# Patient Record
Sex: MALE | Race: ASIAN | NOT HISPANIC OR LATINO | URBAN - METROPOLITAN AREA
[De-identification: names, ages, dates, MRNs, and addresses within clinical notes are randomized per-mention and may not be internally consistent; named-entity substitution may affect disease eponyms.]

---

## 2021-01-06 ENCOUNTER — OUTPATIENT (OUTPATIENT)
Dept: OUTPATIENT SERVICES | Facility: HOSPITAL | Age: 46
LOS: 1 days | End: 2021-01-06
Payer: MEDICAID

## 2021-01-06 VITALS
HEART RATE: 69 BPM | SYSTOLIC BLOOD PRESSURE: 138 MMHG | TEMPERATURE: 98 F | DIASTOLIC BLOOD PRESSURE: 88 MMHG | HEIGHT: 66 IN | RESPIRATION RATE: 15 BRPM | WEIGHT: 186.95 LBS | OXYGEN SATURATION: 99 %

## 2021-01-06 DIAGNOSIS — Z98.890 OTHER SPECIFIED POSTPROCEDURAL STATES: Chronic | ICD-10-CM

## 2021-01-06 DIAGNOSIS — Z98.49 CATARACT EXTRACTION STATUS, UNSPECIFIED EYE: Chronic | ICD-10-CM

## 2021-01-06 DIAGNOSIS — M51.9 UNSPECIFIED THORACIC, THORACOLUMBAR AND LUMBOSACRAL INTERVERTEBRAL DISC DISORDER: ICD-10-CM

## 2021-01-06 DIAGNOSIS — M47.816 SPONDYLOSIS WITHOUT MYELOPATHY OR RADICULOPATHY, LUMBAR REGION: ICD-10-CM

## 2021-01-06 DIAGNOSIS — Z29.9 ENCOUNTER FOR PROPHYLACTIC MEASURES, UNSPECIFIED: ICD-10-CM

## 2021-01-06 DIAGNOSIS — Z01.818 ENCOUNTER FOR OTHER PREPROCEDURAL EXAMINATION: ICD-10-CM

## 2021-01-06 LAB
ANION GAP SERPL CALC-SCNC: 12 MMOL/L — SIGNIFICANT CHANGE UP (ref 5–17)
BLD GP AB SCN SERPL QL: NEGATIVE — SIGNIFICANT CHANGE UP
BUN SERPL-MCNC: 8 MG/DL — SIGNIFICANT CHANGE UP (ref 7–23)
CALCIUM SERPL-MCNC: 9.6 MG/DL — SIGNIFICANT CHANGE UP (ref 8.4–10.5)
CHLORIDE SERPL-SCNC: 104 MMOL/L — SIGNIFICANT CHANGE UP (ref 96–108)
CO2 SERPL-SCNC: 24 MMOL/L — SIGNIFICANT CHANGE UP (ref 22–31)
CREAT SERPL-MCNC: 0.88 MG/DL — SIGNIFICANT CHANGE UP (ref 0.5–1.3)
GLUCOSE SERPL-MCNC: 98 MG/DL — SIGNIFICANT CHANGE UP (ref 70–99)
HCT VFR BLD CALC: 45.7 % — SIGNIFICANT CHANGE UP (ref 39–50)
HGB BLD-MCNC: 14.7 G/DL — SIGNIFICANT CHANGE UP (ref 13–17)
MCHC RBC-ENTMCNC: 28.8 PG — SIGNIFICANT CHANGE UP (ref 27–34)
MCHC RBC-ENTMCNC: 32.2 GM/DL — SIGNIFICANT CHANGE UP (ref 32–36)
MCV RBC AUTO: 89.4 FL — SIGNIFICANT CHANGE UP (ref 80–100)
NRBC # BLD: 0 /100 WBCS — SIGNIFICANT CHANGE UP (ref 0–0)
PLATELET # BLD AUTO: 283 K/UL — SIGNIFICANT CHANGE UP (ref 150–400)
POTASSIUM SERPL-MCNC: 4 MMOL/L — SIGNIFICANT CHANGE UP (ref 3.5–5.3)
POTASSIUM SERPL-SCNC: 4 MMOL/L — SIGNIFICANT CHANGE UP (ref 3.5–5.3)
RBC # BLD: 5.11 M/UL — SIGNIFICANT CHANGE UP (ref 4.2–5.8)
RBC # FLD: 12.7 % — SIGNIFICANT CHANGE UP (ref 10.3–14.5)
RH IG SCN BLD-IMP: POSITIVE — SIGNIFICANT CHANGE UP
SODIUM SERPL-SCNC: 140 MMOL/L — SIGNIFICANT CHANGE UP (ref 135–145)
WBC # BLD: 9.1 K/UL — SIGNIFICANT CHANGE UP (ref 3.8–10.5)
WBC # FLD AUTO: 9.1 K/UL — SIGNIFICANT CHANGE UP (ref 3.8–10.5)

## 2021-01-06 PROCEDURE — 87640 STAPH A DNA AMP PROBE: CPT

## 2021-01-06 PROCEDURE — 86900 BLOOD TYPING SEROLOGIC ABO: CPT

## 2021-01-06 PROCEDURE — 86850 RBC ANTIBODY SCREEN: CPT

## 2021-01-06 PROCEDURE — 87641 MR-STAPH DNA AMP PROBE: CPT

## 2021-01-06 PROCEDURE — 80048 BASIC METABOLIC PNL TOTAL CA: CPT

## 2021-01-06 PROCEDURE — 85027 COMPLETE CBC AUTOMATED: CPT

## 2021-01-06 PROCEDURE — G0463: CPT

## 2021-01-06 PROCEDURE — 86901 BLOOD TYPING SEROLOGIC RH(D): CPT

## 2021-01-06 RX ORDER — CEFAZOLIN SODIUM 1 G
2000 VIAL (EA) INJECTION ONCE
Refills: 0 | Status: DISCONTINUED | OUTPATIENT
Start: 2021-01-22 | End: 2021-01-23

## 2021-01-06 NOTE — H&P PST ADULT - NSICDXPASTMEDICALHX_GEN_ALL_CORE_FT
PAST MEDICAL HISTORY:  Lumbar herniated disc     DESIREE (obstructive sleep apnea) noncompliant with CPAP

## 2021-01-06 NOTE — H&P PST ADULT - ACTIVITY
able to walk briskly for about 5 mins and then will begin to experience back pain which limits his activity

## 2021-01-06 NOTE — H&P PST ADULT - NSICDXPROBLEM_GEN_ALL_CORE_FT
PROBLEM DIAGNOSES  Problem: Lumbar disc disease  Assessment and Plan: Scheduled lami and fusion  nasal swab collected  will c/w meds as prescribed      Problem: Need for prophylactic measure  Assessment and Plan: The Caprini score indicates this patient is at risk for a VTE event (score 3-5).  Most surgical patients in this group would benefit from pharmacologic prophylaxis.  The surgical team will determine the balance between VTE risk and bleeding risk

## 2021-01-06 NOTE — H&P PST ADULT - HISTORY OF PRESENT ILLNESS
44 y/o male with  46 y/o male with h/o low back pain s/p lumbar discectomy in 2007. Over the past 3 years, pt states he has been experiencing worsening low back pain with pain radiating into both legs with an occasional "hot sensation" in his calves. He is currently taking meloxicam which offer little to no pain relief. Presents for L3-L4 laminectomy, posterior lumbar interbody fusion, pedicle screw fixation, iliac crest bone graft.       *covid scheduled for 1/19 at [facility].

## 2021-01-06 NOTE — H&P PST ADULT - ASSESSMENT
CAPRINI SCORE [CLOT updated 18]    AGE RELATED RISK FACTORS                                                       MOBILITY RELATED FACTORS  [ ] Age 41-60 years                                            (1 Point)                    [ ] Bed rest                                                        (1 Point)  [ ] Age: 61-74 years                                           (2 Points)                  [ ] Plaster cast                                                   (2 Points)  [ ] Age= 75 years                                              (3 Points)                    [ ] Bed bound for more than 72 hours                 (2 Points)    DISEASE RELATED RISK FACTORS                                               GENDER SPECIFIC FACTORS  [ ] Edema in the lower extremities                       (1 Point)              [ ] Pregnancy                                                     (1 Point)  [ ] Varicose veins                                               (1 Point)                     [ ] Post-partum < 6 weeks                                   (1 Point)             [ ] BMI > 25 Kg/m2                                            (1 Point)                     [ ] Hormonal therapy  or oral contraception          (1 Point)                 [ ] Sepsis (in the previous month)                        (1 Point)               [ ] History of pregnancy complications                 (1 point)  [ ] Pneumonia or serious lung disease                                               [ ] Unexplained or recurrent                     (1 Point)           (in the previous month)                               (1 Point)  [ ] Abnormal pulmonary function test                     (1 Point)                 SURGERY RELATED RISK FACTORS  [ ] Acute myocardial infarction                              (1 Point)               [ ]  Section                                             (1 Point)  [ ] Congestive heart failure (in the previous month)  (1 Point)      [ ] Minor surgery                                                  (1 Point)   [ ] Inflammatory bowel disease                             (1 Point)               [ ] Arthroscopic surgery                                        (2 Points)  [ ] Central venous access                                      (2 Points)                [ ] General surgery lasting more than 45 minutes (2 points)  [ ] Present or previous malignancy                     (2 Points)                [ ] Elective arthroplasty                                         (5 points)    [ ] Stroke (in the previous month)                          (5 Points)                                                                                                                                                           HEMATOLOGY RELATED FACTORS                                                 TRAUMA RELATED RISK FACTORS  [ ] Prior episodes of VTE                                     (3 Points)                [ ] Fracture of the hip, pelvis, or leg                       (5 Points)  [ ] Positive family history for VTE                         (3 Points)             [ ] Acute spinal cord injury (in the previous month)  (5 Points)  [ ] Prothrombin 41503 A                                     (3 Points)               [ ] Paralysis  (less than 1 month)                             (5 Points)  [ ] Factor V Leiden                                             (3 Points)                  [ ] Multiple Trauma within 1 month                        (5 Points)  [ ] Lupus anticoagulants                                     (3 Points)                                                           [ ] Anticardiolipin antibodies                               (3 Points)                                                       [ ] High homocysteine in the blood                      (3 Points)                                             [ ] Other congenital or acquired thrombophilia      (3 Points)                                                [ ] Heparin induced thrombocytopenia                  (3 Points)                                     Total Score [    4      ]

## 2021-01-07 LAB
MRSA PCR RESULT.: SIGNIFICANT CHANGE UP
S AUREUS DNA NOSE QL NAA+PROBE: SIGNIFICANT CHANGE UP

## 2021-01-16 PROBLEM — G47.33 OBSTRUCTIVE SLEEP APNEA (ADULT) (PEDIATRIC): Chronic | Status: ACTIVE | Noted: 2021-01-06

## 2021-01-16 PROBLEM — M51.26 OTHER INTERVERTEBRAL DISC DISPLACEMENT, LUMBAR REGION: Chronic | Status: ACTIVE | Noted: 2021-01-06

## 2021-01-19 ENCOUNTER — OUTPATIENT (OUTPATIENT)
Dept: OUTPATIENT SERVICES | Facility: HOSPITAL | Age: 46
LOS: 1 days | End: 2021-01-19
Payer: MEDICAID

## 2021-01-19 DIAGNOSIS — Z20.828 CONTACT WITH AND (SUSPECTED) EXPOSURE TO OTHER VIRAL COMMUNICABLE DISEASES: ICD-10-CM

## 2021-01-19 DIAGNOSIS — Z98.890 OTHER SPECIFIED POSTPROCEDURAL STATES: Chronic | ICD-10-CM

## 2021-01-19 DIAGNOSIS — Z98.49 CATARACT EXTRACTION STATUS, UNSPECIFIED EYE: Chronic | ICD-10-CM

## 2021-01-19 LAB — SARS-COV-2 RNA SPEC QL NAA+PROBE: SIGNIFICANT CHANGE UP

## 2021-01-19 PROCEDURE — U0003: CPT

## 2021-01-19 PROCEDURE — U0005: CPT

## 2021-01-19 PROCEDURE — C9803: CPT

## 2021-01-22 ENCOUNTER — INPATIENT (INPATIENT)
Facility: HOSPITAL | Age: 46
LOS: 4 days | Discharge: HOME HEALTH | DRG: 454 | End: 2021-01-27
Attending: NEUROLOGICAL SURGERY | Admitting: NEUROLOGICAL SURGERY
Payer: MEDICAID

## 2021-01-22 VITALS
HEIGHT: 66 IN | OXYGEN SATURATION: 98 % | HEART RATE: 83 BPM | SYSTOLIC BLOOD PRESSURE: 137 MMHG | TEMPERATURE: 98 F | RESPIRATION RATE: 18 BRPM | DIASTOLIC BLOOD PRESSURE: 91 MMHG | WEIGHT: 186.95 LBS

## 2021-01-22 DIAGNOSIS — Z98.890 OTHER SPECIFIED POSTPROCEDURAL STATES: Chronic | ICD-10-CM

## 2021-01-22 DIAGNOSIS — M47.816 SPONDYLOSIS WITHOUT MYELOPATHY OR RADICULOPATHY, LUMBAR REGION: ICD-10-CM

## 2021-01-22 DIAGNOSIS — Z98.49 CATARACT EXTRACTION STATUS, UNSPECIFIED EYE: Chronic | ICD-10-CM

## 2021-01-22 LAB
ANION GAP SERPL CALC-SCNC: 10 MMOL/L — SIGNIFICANT CHANGE UP (ref 5–17)
BASOPHILS # BLD AUTO: 0.01 K/UL — SIGNIFICANT CHANGE UP (ref 0–0.2)
BASOPHILS NFR BLD AUTO: 0.1 % — SIGNIFICANT CHANGE UP (ref 0–2)
BUN SERPL-MCNC: 8 MG/DL — SIGNIFICANT CHANGE UP (ref 7–23)
CALCIUM SERPL-MCNC: 7.4 MG/DL — LOW (ref 8.4–10.5)
CHLORIDE SERPL-SCNC: 109 MMOL/L — HIGH (ref 96–108)
CO2 SERPL-SCNC: 20 MMOL/L — LOW (ref 22–31)
CREAT SERPL-MCNC: 0.74 MG/DL — SIGNIFICANT CHANGE UP (ref 0.5–1.3)
EOSINOPHIL # BLD AUTO: 0 K/UL — SIGNIFICANT CHANGE UP (ref 0–0.5)
EOSINOPHIL NFR BLD AUTO: 0 % — SIGNIFICANT CHANGE UP (ref 0–6)
GAS PNL BLDA: SIGNIFICANT CHANGE UP
GLUCOSE SERPL-MCNC: 167 MG/DL — HIGH (ref 70–99)
HCT VFR BLD CALC: 31.9 % — LOW (ref 39–50)
HCT VFR BLD CALC: 32.6 % — LOW (ref 39–50)
HGB BLD-MCNC: 10.8 G/DL — LOW (ref 13–17)
HGB BLD-MCNC: 10.9 G/DL — LOW (ref 13–17)
IMM GRANULOCYTES NFR BLD AUTO: 1 % — SIGNIFICANT CHANGE UP (ref 0–1.5)
LYMPHOCYTES # BLD AUTO: 1.41 K/UL — SIGNIFICANT CHANGE UP (ref 1–3.3)
LYMPHOCYTES # BLD AUTO: 11.4 % — LOW (ref 13–44)
MCHC RBC-ENTMCNC: 29.6 PG — SIGNIFICANT CHANGE UP (ref 27–34)
MCHC RBC-ENTMCNC: 29.6 PG — SIGNIFICANT CHANGE UP (ref 27–34)
MCHC RBC-ENTMCNC: 33.4 GM/DL — SIGNIFICANT CHANGE UP (ref 32–36)
MCHC RBC-ENTMCNC: 33.9 GM/DL — SIGNIFICANT CHANGE UP (ref 32–36)
MCV RBC AUTO: 87.4 FL — SIGNIFICANT CHANGE UP (ref 80–100)
MCV RBC AUTO: 88.6 FL — SIGNIFICANT CHANGE UP (ref 80–100)
MONOCYTES # BLD AUTO: 0.31 K/UL — SIGNIFICANT CHANGE UP (ref 0–0.9)
MONOCYTES NFR BLD AUTO: 2.5 % — SIGNIFICANT CHANGE UP (ref 2–14)
NEUTROPHILS # BLD AUTO: 10.53 K/UL — HIGH (ref 1.8–7.4)
NEUTROPHILS NFR BLD AUTO: 85 % — HIGH (ref 43–77)
NRBC # BLD: 0 /100 WBCS — SIGNIFICANT CHANGE UP (ref 0–0)
NRBC # BLD: 0 /100 WBCS — SIGNIFICANT CHANGE UP (ref 0–0)
PLATELET # BLD AUTO: 219 K/UL — SIGNIFICANT CHANGE UP (ref 150–400)
PLATELET # BLD AUTO: 222 K/UL — SIGNIFICANT CHANGE UP (ref 150–400)
POTASSIUM SERPL-MCNC: 3.8 MMOL/L — SIGNIFICANT CHANGE UP (ref 3.5–5.3)
POTASSIUM SERPL-SCNC: 3.8 MMOL/L — SIGNIFICANT CHANGE UP (ref 3.5–5.3)
RBC # BLD: 3.65 M/UL — LOW (ref 4.2–5.8)
RBC # BLD: 3.68 M/UL — LOW (ref 4.2–5.8)
RBC # FLD: 12.6 % — SIGNIFICANT CHANGE UP (ref 10.3–14.5)
RBC # FLD: 12.6 % — SIGNIFICANT CHANGE UP (ref 10.3–14.5)
RH IG SCN BLD-IMP: POSITIVE — SIGNIFICANT CHANGE UP
SODIUM SERPL-SCNC: 139 MMOL/L — SIGNIFICANT CHANGE UP (ref 135–145)
WBC # BLD: 12.39 K/UL — HIGH (ref 3.8–10.5)
WBC # BLD: 15.02 K/UL — HIGH (ref 3.8–10.5)
WBC # FLD AUTO: 12.39 K/UL — HIGH (ref 3.8–10.5)
WBC # FLD AUTO: 15.02 K/UL — HIGH (ref 3.8–10.5)

## 2021-01-22 RX ORDER — LORATADINE 10 MG/1
1 TABLET ORAL
Qty: 0 | Refills: 0 | DISCHARGE

## 2021-01-22 RX ORDER — SODIUM CHLORIDE 9 MG/ML
1000 INJECTION, SOLUTION INTRAVENOUS
Refills: 0 | Status: DISCONTINUED | OUTPATIENT
Start: 2021-01-22 | End: 2021-01-27

## 2021-01-22 RX ORDER — POLYETHYLENE GLYCOL 3350 17 G/17G
17 POWDER, FOR SOLUTION ORAL
Refills: 0 | Status: DISCONTINUED | OUTPATIENT
Start: 2021-01-22 | End: 2021-01-27

## 2021-01-22 RX ORDER — SODIUM CHLORIDE 9 MG/ML
1000 INJECTION, SOLUTION INTRAVENOUS ONCE
Refills: 0 | Status: COMPLETED | OUTPATIENT
Start: 2021-01-22 | End: 2021-01-22

## 2021-01-22 RX ORDER — ONDANSETRON 8 MG/1
8 TABLET, FILM COATED ORAL ONCE
Refills: 0 | Status: DISCONTINUED | OUTPATIENT
Start: 2021-01-22 | End: 2021-01-23

## 2021-01-22 RX ORDER — ALBUMIN HUMAN 25 %
250 VIAL (ML) INTRAVENOUS ONCE
Refills: 0 | Status: COMPLETED | OUTPATIENT
Start: 2021-01-22 | End: 2021-01-22

## 2021-01-22 RX ORDER — LORATADINE 10 MG/1
10 TABLET ORAL DAILY
Refills: 0 | Status: DISCONTINUED | OUTPATIENT
Start: 2021-01-22 | End: 2021-01-27

## 2021-01-22 RX ORDER — OXYCODONE HYDROCHLORIDE 5 MG/1
10 TABLET ORAL EVERY 4 HOURS
Refills: 0 | Status: DISCONTINUED | OUTPATIENT
Start: 2021-01-22 | End: 2021-01-27

## 2021-01-22 RX ORDER — INFLUENZA VIRUS VACCINE 15; 15; 15; 15 UG/.5ML; UG/.5ML; UG/.5ML; UG/.5ML
0.5 SUSPENSION INTRAMUSCULAR ONCE
Refills: 0 | Status: DISCONTINUED | OUTPATIENT
Start: 2021-01-22 | End: 2021-01-27

## 2021-01-22 RX ORDER — HYDROMORPHONE HYDROCHLORIDE 2 MG/ML
0.25 INJECTION INTRAMUSCULAR; INTRAVENOUS; SUBCUTANEOUS ONCE
Refills: 0 | Status: DISCONTINUED | OUTPATIENT
Start: 2021-01-22 | End: 2021-01-22

## 2021-01-22 RX ORDER — ACETAMINOPHEN 500 MG
1000 TABLET ORAL ONCE
Refills: 0 | Status: COMPLETED | OUTPATIENT
Start: 2021-01-22 | End: 2021-01-22

## 2021-01-22 RX ORDER — CYCLOBENZAPRINE HYDROCHLORIDE 10 MG/1
5 TABLET, FILM COATED ORAL EVERY 8 HOURS
Refills: 0 | Status: DISCONTINUED | OUTPATIENT
Start: 2021-01-22 | End: 2021-01-23

## 2021-01-22 RX ORDER — HYDROMORPHONE HYDROCHLORIDE 2 MG/ML
0.25 INJECTION INTRAMUSCULAR; INTRAVENOUS; SUBCUTANEOUS
Refills: 0 | Status: DISCONTINUED | OUTPATIENT
Start: 2021-01-22 | End: 2021-01-22

## 2021-01-22 RX ORDER — HYDROMORPHONE HYDROCHLORIDE 2 MG/ML
1 INJECTION INTRAMUSCULAR; INTRAVENOUS; SUBCUTANEOUS
Refills: 0 | Status: DISCONTINUED | OUTPATIENT
Start: 2021-01-22 | End: 2021-01-23

## 2021-01-22 RX ORDER — OXYCODONE HYDROCHLORIDE 5 MG/1
5 TABLET ORAL EVERY 4 HOURS
Refills: 0 | Status: DISCONTINUED | OUTPATIENT
Start: 2021-01-22 | End: 2021-01-27

## 2021-01-22 RX ORDER — LIDOCAINE HCL 20 MG/ML
0.2 VIAL (ML) INJECTION ONCE
Refills: 0 | Status: DISCONTINUED | OUTPATIENT
Start: 2021-01-22 | End: 2021-01-22

## 2021-01-22 RX ORDER — SODIUM CHLORIDE 9 MG/ML
3 INJECTION INTRAMUSCULAR; INTRAVENOUS; SUBCUTANEOUS EVERY 8 HOURS
Refills: 0 | Status: DISCONTINUED | OUTPATIENT
Start: 2021-01-22 | End: 2021-01-22

## 2021-01-22 RX ORDER — SODIUM CHLORIDE 9 MG/ML
1000 INJECTION, SOLUTION INTRAVENOUS
Refills: 0 | Status: DISCONTINUED | OUTPATIENT
Start: 2021-01-22 | End: 2021-01-23

## 2021-01-22 RX ORDER — CHLORHEXIDINE GLUCONATE 213 G/1000ML
1 SOLUTION TOPICAL ONCE
Refills: 0 | Status: DISCONTINUED | OUTPATIENT
Start: 2021-01-22 | End: 2021-01-22

## 2021-01-22 RX ORDER — ACETAMINOPHEN 500 MG
650 TABLET ORAL EVERY 6 HOURS
Refills: 0 | Status: DISCONTINUED | OUTPATIENT
Start: 2021-01-23 | End: 2021-01-27

## 2021-01-22 RX ORDER — METOCLOPRAMIDE HCL 10 MG
10 TABLET ORAL ONCE
Refills: 0 | Status: DISCONTINUED | OUTPATIENT
Start: 2021-01-22 | End: 2021-01-23

## 2021-01-22 RX ORDER — CEFAZOLIN SODIUM 1 G
2000 VIAL (EA) INJECTION EVERY 8 HOURS
Refills: 0 | Status: COMPLETED | OUTPATIENT
Start: 2021-01-22 | End: 2021-01-23

## 2021-01-22 RX ORDER — FENTANYL CITRATE 50 UG/ML
25 INJECTION INTRAVENOUS
Refills: 0 | Status: DISCONTINUED | OUTPATIENT
Start: 2021-01-22 | End: 2021-01-23

## 2021-01-22 RX ADMIN — Medication 1000 MILLILITER(S): at 20:30

## 2021-01-22 RX ADMIN — SODIUM CHLORIDE 75 MILLILITER(S): 9 INJECTION, SOLUTION INTRAVENOUS at 23:01

## 2021-01-22 RX ADMIN — FENTANYL CITRATE 25 MICROGRAM(S): 50 INJECTION INTRAVENOUS at 16:55

## 2021-01-22 RX ADMIN — HYDROMORPHONE HYDROCHLORIDE 0.25 MILLIGRAM(S): 2 INJECTION INTRAMUSCULAR; INTRAVENOUS; SUBCUTANEOUS at 18:55

## 2021-01-22 RX ADMIN — SODIUM CHLORIDE 75 MILLILITER(S): 9 INJECTION, SOLUTION INTRAVENOUS at 16:21

## 2021-01-22 RX ADMIN — HYDROMORPHONE HYDROCHLORIDE 0.25 MILLIGRAM(S): 2 INJECTION INTRAMUSCULAR; INTRAVENOUS; SUBCUTANEOUS at 19:40

## 2021-01-22 RX ADMIN — SODIUM CHLORIDE 4000 MILLILITER(S): 9 INJECTION, SOLUTION INTRAVENOUS at 21:45

## 2021-01-22 RX ADMIN — SODIUM CHLORIDE 4000 MILLILITER(S): 9 INJECTION, SOLUTION INTRAVENOUS at 21:00

## 2021-01-22 RX ADMIN — CYCLOBENZAPRINE HYDROCHLORIDE 5 MILLIGRAM(S): 10 TABLET, FILM COATED ORAL at 21:02

## 2021-01-22 RX ADMIN — SODIUM CHLORIDE 75 MILLILITER(S): 9 INJECTION, SOLUTION INTRAVENOUS at 16:30

## 2021-01-22 RX ADMIN — Medication 125 MILLILITER(S): at 19:25

## 2021-01-22 RX ADMIN — OXYCODONE HYDROCHLORIDE 10 MILLIGRAM(S): 5 TABLET ORAL at 22:58

## 2021-01-22 RX ADMIN — Medication 100 MILLIGRAM(S): at 21:00

## 2021-01-22 RX ADMIN — Medication 400 MILLIGRAM(S): at 21:00

## 2021-01-22 RX ADMIN — HYDROMORPHONE HYDROCHLORIDE 0.25 MILLIGRAM(S): 2 INJECTION INTRAMUSCULAR; INTRAVENOUS; SUBCUTANEOUS at 17:50

## 2021-01-22 NOTE — PRE-ANESTHESIA EVALUATION ADULT - NSPROPOSEDPROCEDFT_GEN_ALL_CORE
L3-L4 laminectomy, posterior lumbar interbody fusion, pedicle screw fixation, iliac crest bone graft.

## 2021-01-22 NOTE — PROGRESS NOTE ADULT - ASSESSMENT
Patient seen and examined at bedside s/p L3-5 PLIF with ICBG. Doing well. AAOx3, HAMMOND strongly, LE 4+/5 pain limited SILT, wound intact.  -PACU Floor, cleared after [4] hours.  -Q4 neurochecks after PACI  -Q4 vitals  -Pain control  -Advance diet as tolerated  -PT/OT  -HMV x 2

## 2021-01-23 DIAGNOSIS — M51.26 OTHER INTERVERTEBRAL DISC DISPLACEMENT, LUMBAR REGION: ICD-10-CM

## 2021-01-23 LAB
ANION GAP SERPL CALC-SCNC: 9 MMOL/L — SIGNIFICANT CHANGE UP (ref 5–17)
APPEARANCE UR: CLEAR — SIGNIFICANT CHANGE UP
BACTERIA # UR AUTO: NEGATIVE — SIGNIFICANT CHANGE UP
BILIRUB UR-MCNC: NEGATIVE — SIGNIFICANT CHANGE UP
BUN SERPL-MCNC: 10 MG/DL — SIGNIFICANT CHANGE UP (ref 7–23)
CALCIUM SERPL-MCNC: 8.3 MG/DL — LOW (ref 8.4–10.5)
CHLORIDE SERPL-SCNC: 105 MMOL/L — SIGNIFICANT CHANGE UP (ref 96–108)
CO2 SERPL-SCNC: 22 MMOL/L — SIGNIFICANT CHANGE UP (ref 22–31)
COLOR SPEC: COLORLESS — SIGNIFICANT CHANGE UP
CREAT SERPL-MCNC: 0.78 MG/DL — SIGNIFICANT CHANGE UP (ref 0.5–1.3)
DIFF PNL FLD: ABNORMAL
EPI CELLS # UR: 1 /HPF — SIGNIFICANT CHANGE UP
GLUCOSE SERPL-MCNC: 110 MG/DL — HIGH (ref 70–99)
GLUCOSE UR QL: NEGATIVE — SIGNIFICANT CHANGE UP
HCT VFR BLD CALC: 29 % — LOW (ref 39–50)
HGB BLD-MCNC: 9.7 G/DL — LOW (ref 13–17)
HYALINE CASTS # UR AUTO: 0 /LPF — SIGNIFICANT CHANGE UP (ref 0–7)
KETONES UR-MCNC: NEGATIVE — SIGNIFICANT CHANGE UP
LEUKOCYTE ESTERASE UR-ACNC: NEGATIVE — SIGNIFICANT CHANGE UP
MCHC RBC-ENTMCNC: 29.9 PG — SIGNIFICANT CHANGE UP (ref 27–34)
MCHC RBC-ENTMCNC: 33.4 GM/DL — SIGNIFICANT CHANGE UP (ref 32–36)
MCV RBC AUTO: 89.5 FL — SIGNIFICANT CHANGE UP (ref 80–100)
NITRITE UR-MCNC: NEGATIVE — SIGNIFICANT CHANGE UP
NRBC # BLD: 0 /100 WBCS — SIGNIFICANT CHANGE UP (ref 0–0)
PH UR: 7.5 — SIGNIFICANT CHANGE UP (ref 5–8)
PLATELET # BLD AUTO: 198 K/UL — SIGNIFICANT CHANGE UP (ref 150–400)
POTASSIUM SERPL-MCNC: 3.7 MMOL/L — SIGNIFICANT CHANGE UP (ref 3.5–5.3)
POTASSIUM SERPL-SCNC: 3.7 MMOL/L — SIGNIFICANT CHANGE UP (ref 3.5–5.3)
PROT UR-MCNC: NEGATIVE — SIGNIFICANT CHANGE UP
RBC # BLD: 3.24 M/UL — LOW (ref 4.2–5.8)
RBC # FLD: 13 % — SIGNIFICANT CHANGE UP (ref 10.3–14.5)
RBC CASTS # UR COMP ASSIST: 5 /HPF — HIGH (ref 0–4)
SODIUM SERPL-SCNC: 136 MMOL/L — SIGNIFICANT CHANGE UP (ref 135–145)
SP GR SPEC: 1.01 — SIGNIFICANT CHANGE UP (ref 1.01–1.02)
UROBILINOGEN FLD QL: NEGATIVE — SIGNIFICANT CHANGE UP
WBC # BLD: 15.56 K/UL — HIGH (ref 3.8–10.5)
WBC # FLD AUTO: 15.56 K/UL — HIGH (ref 3.8–10.5)
WBC UR QL: 2 /HPF — SIGNIFICANT CHANGE UP (ref 0–5)

## 2021-01-23 PROCEDURE — 71045 X-RAY EXAM CHEST 1 VIEW: CPT | Mod: 26

## 2021-01-23 RX ORDER — ACETAMINOPHEN 500 MG
1000 TABLET ORAL ONCE
Refills: 0 | Status: COMPLETED | OUTPATIENT
Start: 2021-01-23 | End: 2021-01-23

## 2021-01-23 RX ORDER — SIMETHICONE 80 MG/1
80 TABLET, CHEWABLE ORAL ONCE
Refills: 0 | Status: COMPLETED | OUTPATIENT
Start: 2021-01-23 | End: 2021-01-23

## 2021-01-23 RX ORDER — SODIUM CHLORIDE 9 MG/ML
500 INJECTION INTRAMUSCULAR; INTRAVENOUS; SUBCUTANEOUS ONCE
Refills: 0 | Status: COMPLETED | OUTPATIENT
Start: 2021-01-23 | End: 2021-01-23

## 2021-01-23 RX ORDER — ENOXAPARIN SODIUM 100 MG/ML
40 INJECTION SUBCUTANEOUS AT BEDTIME
Refills: 0 | Status: DISCONTINUED | OUTPATIENT
Start: 2021-01-23 | End: 2021-01-27

## 2021-01-23 RX ORDER — SENNA PLUS 8.6 MG/1
2 TABLET ORAL AT BEDTIME
Refills: 0 | Status: DISCONTINUED | OUTPATIENT
Start: 2021-01-23 | End: 2021-01-27

## 2021-01-23 RX ADMIN — SODIUM CHLORIDE 500 MILLILITER(S): 9 INJECTION INTRAMUSCULAR; INTRAVENOUS; SUBCUTANEOUS at 10:35

## 2021-01-23 RX ADMIN — CYCLOBENZAPRINE HYDROCHLORIDE 5 MILLIGRAM(S): 10 TABLET, FILM COATED ORAL at 04:06

## 2021-01-23 RX ADMIN — SENNA PLUS 2 TABLET(S): 8.6 TABLET ORAL at 22:20

## 2021-01-23 RX ADMIN — SODIUM CHLORIDE 75 MILLILITER(S): 9 INJECTION, SOLUTION INTRAVENOUS at 03:04

## 2021-01-23 RX ADMIN — SIMETHICONE 80 MILLIGRAM(S): 80 TABLET, CHEWABLE ORAL at 20:50

## 2021-01-23 RX ADMIN — POLYETHYLENE GLYCOL 3350 17 GRAM(S): 17 POWDER, FOR SOLUTION ORAL at 17:17

## 2021-01-23 RX ADMIN — POLYETHYLENE GLYCOL 3350 17 GRAM(S): 17 POWDER, FOR SOLUTION ORAL at 06:21

## 2021-01-23 RX ADMIN — Medication 400 MILLIGRAM(S): at 10:16

## 2021-01-23 RX ADMIN — Medication 100 MILLIGRAM(S): at 04:06

## 2021-01-23 RX ADMIN — OXYCODONE HYDROCHLORIDE 10 MILLIGRAM(S): 5 TABLET ORAL at 13:26

## 2021-01-23 RX ADMIN — Medication 400 MILLIGRAM(S): at 20:49

## 2021-01-23 RX ADMIN — OXYCODONE HYDROCHLORIDE 10 MILLIGRAM(S): 5 TABLET ORAL at 18:02

## 2021-01-23 RX ADMIN — ENOXAPARIN SODIUM 40 MILLIGRAM(S): 100 INJECTION SUBCUTANEOUS at 22:20

## 2021-01-23 RX ADMIN — Medication 400 MILLIGRAM(S): at 03:03

## 2021-01-23 RX ADMIN — OXYCODONE HYDROCHLORIDE 10 MILLIGRAM(S): 5 TABLET ORAL at 02:05

## 2021-01-23 RX ADMIN — OXYCODONE HYDROCHLORIDE 10 MILLIGRAM(S): 5 TABLET ORAL at 06:21

## 2021-01-23 RX ADMIN — LORATADINE 10 MILLIGRAM(S): 10 TABLET ORAL at 22:20

## 2021-01-23 RX ADMIN — SODIUM CHLORIDE 75 MILLILITER(S): 9 INJECTION, SOLUTION INTRAVENOUS at 10:37

## 2021-01-23 NOTE — PHYSICAL THERAPY INITIAL EVALUATION ADULT - PLANNED THERAPY INTERVENTIONS, PT EVAL
GOALS: Pt will negotiate 12 steps with unilateral handrail & step to pattern with supervision in 4wks/bed mobility training/gait training/transfer training

## 2021-01-23 NOTE — PHYSICAL THERAPY INITIAL EVALUATION ADULT - PERTINENT HX OF CURRENT PROBLEM, REHAB EVAL
Pt is 45M admitted 1/22/21 PMHx low back pain s/p lumbar discectomy(2007). Over the past 3 years, pt states he has been experiencing worsening low back pain with pain radiating into both legs with an occasional "hot sensation" in his calves.

## 2021-01-23 NOTE — CHART NOTE - NSCHARTNOTEFT_GEN_A_CORE
CAPRINI SCORE [CLOT] Score on Admission for     AGE RELATED RISK FACTORS                                                       MOBILITY RELATED FACTORS  [X ] Age 41-60 years                                            (1 Point)                  [ ] Bed rest                                                        (1 Point)  [ ] Age: 61-74 years                                           (2 Points)                 [ ] Plaster cast                                                   (2 Points)  [ ] Age= 75 years                                              (3 Points)                 [ ] Bed bound for more than 72 hours                 (2 Points)    DISEASE RELATED RISK FACTORS                                               GENDER SPECIFIC FACTORS  [ ] Edema in the lower extremities                       (1 Point)                  [ ] Pregnancy                                                     (1 Point)  [ ] Varicose veins                                               (1 Point)                  [ ] Post-partum < 6 weeks                                   (1 Point)             [X ] BMI > 25 Kg/m2                                            (1 Point)                  [ ] Hormonal therapy  or oral contraception          (1 Point)                 [ ] Sepsis (in the previous month)                        (1 Point)                  [ ] History of pregnancy complications                 (1 point)  [ ] Pneumonia or serious lung disease                                               [ ] Unexplained or recurrent                     (1 Point)           (in the previous month)                               (1 Point)  [ ] Abnormal pulmonary function test                     (1 Point)                 SURGERY RELATED RISK FACTORS (include planned surgeries)  [ ] Acute myocardial infarction                              (1 Point)                 [ ]  Section                                             (1 Point)  [ ] Congestive heart failure (in the previous month)  (1 Point)         [ ] Minor surgery                                                  (1 Point)   [ ] Inflammatory bowel disease                             (1 Point)                 [ ] Arthroscopic surgery                                        (2 Points)  [ ] Central venous access                                      (2 Points)                [ X] General surgery lasting more than 45 minutes   (2 Points)       [ ] Stroke (in the previous month)                          (5 Points)               [ ] Elective arthroplasty                                         (5 Points)            [ ] current or past malignancy                              (2 Points)                                                                                                       HEMATOLOGY RELATED FACTORS                                                 TRAUMA RELATED RISK FACTORS  [ ] Prior episodes of VTE                                     (3 Points)                [ ] Fracture of the hip, pelvis, or leg                       (5 Points)  [ ] Positive family history for VTE                         (3 Points)                 [ ] Acute spinal cord injury (in the previous month)  (5 Points)  [ ] Prothrombin 34075 A                                     (3 Points)                 [ ] Paralysis  (less than 1 month)                             (5 Points)  [ ] Factor V Leiden                                             (3 Points)                  [ ] Multiple Trauma within 1 month                        (5 Points)  [ ] Lupus anticoagulants                                     (3 Points)                                                           [ ] Anticardiolipin antibodies                               (3 Points)                                                       [ ] High homocysteine in the blood                      (3 Points)                                             [ ] Other congenital or acquired thrombophilia      (3 Points)                                                [ ] Heparin induced thrombocytopenia                  (3 Points)                                          Total Score [   4       ]    Risk:  Very low 0   Low 1 to 2   Moderate 3 to 4   High =5       (X) VTE Prophylasix Recommednations:  [ X] chemo prophylasix                                                                                   [ ] contraindicated _____________________    **** HIGH LIKELIHOOD DVT PRESENT ON ADMISSION  [ ] (please order LE dopplers within 24 hours of admission)

## 2021-01-23 NOTE — PHYSICAL THERAPY INITIAL EVALUATION ADULT - GENERAL OBSERVATIONS, REHAB EVAL
Pt received semisupine in bed, pleasant & willing to participate, s/p L3-5 PLIF with ICBG (1/22), +HMVx2, pt educated on spinal precuations with good understanding.

## 2021-01-23 NOTE — PHYSICAL THERAPY INITIAL EVALUATION ADULT - ADDITIONAL COMMENTS
Pt resides in private home with spouse and son, flight with handrail. PTA independent with mobility and ADL's, owns no DME, (+)driving, (+)working.

## 2021-01-23 NOTE — PROGRESS NOTE ADULT - ASSESSMENT
44 y/o male with h/o low back pain s/p lumbar discectomy in 2007. Over the past 3 years, pt states he has been experiencing worsening low back pain with pain radiating into both legs with an occasional "hot sensation" in his calves. He is currently taking meloxicam which offer little to no pain relief. Presents for L3-L4 laminectomy, posterior lumbar interbody fusion, pedicle screw fixation, iliac crest bone graft.       *covid scheduled for 1/19 at [facility]. (06 Jan 2021 17:00)    PROCEDURE:  Adm 1/22 L3-5 PLIF  POD#1    PLAN:  Neuro: Cont HMV drain x2.  Acute blood loss anemia from surgery-Ck CBC AM. Low BP-Hold Flexeril for now, cont IVF.  Leukocytosis from steroids in OR. 1/23 Lemons DC to [facility]. Inc activity/OOB.     Respiratory: Patient instructed to use incentive spirometer [ X] YES [ ] NO              DVT ppx: [X ] SQL [ ] SQH and Venodynes [ ] Left [ ] Right [ X] Bilateral    Discharge Planning:  PT eval-P FU.    More than 30 minutes spent on total encounter: more than 50% of the visit was spent on educating the patient and family regarding condition, medications, follow up plans, signs and symptoms to be concerned with, preparing paperwork, and questions answered regarding discharge.

## 2021-01-23 NOTE — PHYSICAL THERAPY INITIAL EVALUATION ADULT - GAIT DEVIATIONS NOTED, PT EVAL
decreased jensen/increased time in double stance/decreased velocity of limb motion/decreased step length/decreased stride length/decreased weight-shifting ability

## 2021-01-23 NOTE — DISCHARGE NOTE NURSING/CASE MANAGEMENT/SOCIAL WORK - PATIENT PORTAL LINK FT
You can access the FollowMyHealth Patient Portal offered by [facility] by registering at the following website: [email or web address]. By joining [employer]’s FollowMyHealth portal, you will also be able to view your health information using other applications (apps) compatible with our system.

## 2021-01-23 NOTE — PHYSICAL THERAPY INITIAL EVALUATION ADULT - DISCHARGE DISPOSITION, PT EVAL
progress toward home with home PT, RW for ambulation & 3:1 commode; supervision/assist from family as needed/home/home w/ assist/home w/ home PT

## 2021-01-24 LAB
CULTURE RESULTS: NO GROWTH — SIGNIFICANT CHANGE UP
HCT VFR BLD CALC: 31.7 % — LOW (ref 39–50)
HGB BLD-MCNC: 10.2 G/DL — LOW (ref 13–17)
MCHC RBC-ENTMCNC: 29.4 PG — SIGNIFICANT CHANGE UP (ref 27–34)
MCHC RBC-ENTMCNC: 32.2 GM/DL — SIGNIFICANT CHANGE UP (ref 32–36)
MCV RBC AUTO: 91.4 FL — SIGNIFICANT CHANGE UP (ref 80–100)
NRBC # BLD: 0 /100 WBCS — SIGNIFICANT CHANGE UP (ref 0–0)
PLATELET # BLD AUTO: 212 K/UL — SIGNIFICANT CHANGE UP (ref 150–400)
RBC # BLD: 3.47 M/UL — LOW (ref 4.2–5.8)
RBC # FLD: 13.2 % — SIGNIFICANT CHANGE UP (ref 10.3–14.5)
SPECIMEN SOURCE: SIGNIFICANT CHANGE UP
WBC # BLD: 16.17 K/UL — HIGH (ref 3.8–10.5)
WBC # FLD AUTO: 16.17 K/UL — HIGH (ref 3.8–10.5)

## 2021-01-24 PROCEDURE — 74018 RADEX ABDOMEN 1 VIEW: CPT | Mod: 26

## 2021-01-24 RX ORDER — ACETAMINOPHEN 500 MG
1000 TABLET ORAL ONCE
Refills: 0 | Status: COMPLETED | OUTPATIENT
Start: 2021-01-24 | End: 2021-01-24

## 2021-01-24 RX ORDER — ONDANSETRON 8 MG/1
4 TABLET, FILM COATED ORAL ONCE
Refills: 0 | Status: COMPLETED | OUTPATIENT
Start: 2021-01-24 | End: 2021-01-24

## 2021-01-24 RX ORDER — METHOCARBAMOL 500 MG/1
500 TABLET, FILM COATED ORAL EVERY 8 HOURS
Refills: 0 | Status: DISCONTINUED | OUTPATIENT
Start: 2021-01-24 | End: 2021-01-27

## 2021-01-24 RX ORDER — PANTOPRAZOLE SODIUM 20 MG/1
40 TABLET, DELAYED RELEASE ORAL DAILY
Refills: 0 | Status: DISCONTINUED | OUTPATIENT
Start: 2021-01-24 | End: 2021-01-27

## 2021-01-24 RX ADMIN — METHOCARBAMOL 500 MILLIGRAM(S): 500 TABLET, FILM COATED ORAL at 07:45

## 2021-01-24 RX ADMIN — Medication 400 MILLIGRAM(S): at 11:31

## 2021-01-24 RX ADMIN — Medication 650 MILLIGRAM(S): at 05:39

## 2021-01-24 RX ADMIN — Medication 10 MILLIGRAM(S): at 22:06

## 2021-01-24 RX ADMIN — ONDANSETRON 4 MILLIGRAM(S): 8 TABLET, FILM COATED ORAL at 18:02

## 2021-01-24 RX ADMIN — LORATADINE 10 MILLIGRAM(S): 10 TABLET ORAL at 11:31

## 2021-01-24 RX ADMIN — OXYCODONE HYDROCHLORIDE 10 MILLIGRAM(S): 5 TABLET ORAL at 05:38

## 2021-01-24 RX ADMIN — OXYCODONE HYDROCHLORIDE 10 MILLIGRAM(S): 5 TABLET ORAL at 09:28

## 2021-01-24 RX ADMIN — OXYCODONE HYDROCHLORIDE 10 MILLIGRAM(S): 5 TABLET ORAL at 01:36

## 2021-01-24 RX ADMIN — SODIUM CHLORIDE 75 MILLILITER(S): 9 INJECTION, SOLUTION INTRAVENOUS at 18:27

## 2021-01-24 RX ADMIN — POLYETHYLENE GLYCOL 3350 17 GRAM(S): 17 POWDER, FOR SOLUTION ORAL at 17:52

## 2021-01-24 RX ADMIN — POLYETHYLENE GLYCOL 3350 17 GRAM(S): 17 POWDER, FOR SOLUTION ORAL at 05:38

## 2021-01-24 RX ADMIN — ENOXAPARIN SODIUM 40 MILLIGRAM(S): 100 INJECTION SUBCUTANEOUS at 22:06

## 2021-01-24 RX ADMIN — OXYCODONE HYDROCHLORIDE 10 MILLIGRAM(S): 5 TABLET ORAL at 13:20

## 2021-01-24 RX ADMIN — Medication 1 ENEMA: at 20:27

## 2021-01-24 NOTE — PROGRESS NOTE ADULT - ATTENDING COMMENTS
Pt doing well, has incisional back pain.  Fever w/u for Tm 39.2, WBC 17.7. Pt doing well, has incisional back pain.  Fever w/u for Tm 39.2, WBC 16.2.  Continue PT. DC R hemovac.

## 2021-01-24 NOTE — PROGRESS NOTE ADULT - ASSESSMENT
44 y/o male with h/o low back pain s/p lumbar discectomy in 2007. Over the past 3 years, pt states he has been experiencing worsening low back pain with pain radiating into both legs with an occasional "hot sensation" in his calves. He is currently taking meloxicam which offer little to no pain relief. Presents for L3-L4 laminectomy, posterior lumbar interbody fusion, pedicle screw fixation, iliac crest bone graft.       PROCEDURE:  S/P L3-L5 PLIF    POD# 2    PLAN:  NEURO:   -continue neuro checks q 4 hrs  -inc activity, OOB as tolerated  -cont PT/OT  -analgesics prn: Oxy/ robaxin  -Monitor HMV output    PULM:   -room air  -encourage incentive spirometry    HEME/ONC:               DVT ppx: [x] SQL [] SQH [] Venodynes bilaterally   -will order LE dopplers given post op fevers    RENAL:   -White replaced yesterday due to urinary retention  -Void trial tomorrow, d/c white in AM    ID:   -febrile, WBC 17.7, pancultures-p  -UA neg, CXR neg    GI:   -regular diet  -bowel regimen: miralax, senna    DISCHARGE PLANNING: Per PT, Patient is progressing towards d/c home, will await final recommendation

## 2021-01-25 LAB
ANION GAP SERPL CALC-SCNC: 9 MMOL/L — SIGNIFICANT CHANGE UP (ref 5–17)
BUN SERPL-MCNC: 8 MG/DL — SIGNIFICANT CHANGE UP (ref 7–23)
CALCIUM SERPL-MCNC: 8.2 MG/DL — LOW (ref 8.4–10.5)
CHLORIDE SERPL-SCNC: 102 MMOL/L — SIGNIFICANT CHANGE UP (ref 96–108)
CO2 SERPL-SCNC: 26 MMOL/L — SIGNIFICANT CHANGE UP (ref 22–31)
CREAT SERPL-MCNC: 0.79 MG/DL — SIGNIFICANT CHANGE UP (ref 0.5–1.3)
GLUCOSE SERPL-MCNC: 78 MG/DL — SIGNIFICANT CHANGE UP (ref 70–99)
HCT VFR BLD CALC: 30.8 % — LOW (ref 39–50)
HGB BLD-MCNC: 10.3 G/DL — LOW (ref 13–17)
MCHC RBC-ENTMCNC: 29.4 PG — SIGNIFICANT CHANGE UP (ref 27–34)
MCHC RBC-ENTMCNC: 33.4 GM/DL — SIGNIFICANT CHANGE UP (ref 32–36)
MCV RBC AUTO: 88 FL — SIGNIFICANT CHANGE UP (ref 80–100)
NRBC # BLD: 0 /100 WBCS — SIGNIFICANT CHANGE UP (ref 0–0)
PLATELET # BLD AUTO: 219 K/UL — SIGNIFICANT CHANGE UP (ref 150–400)
POTASSIUM SERPL-MCNC: 3.1 MMOL/L — LOW (ref 3.5–5.3)
POTASSIUM SERPL-SCNC: 3.1 MMOL/L — LOW (ref 3.5–5.3)
RBC # BLD: 3.5 M/UL — LOW (ref 4.2–5.8)
RBC # FLD: 12.8 % — SIGNIFICANT CHANGE UP (ref 10.3–14.5)
SODIUM SERPL-SCNC: 137 MMOL/L — SIGNIFICANT CHANGE UP (ref 135–145)
WBC # BLD: 16.29 K/UL — HIGH (ref 3.8–10.5)
WBC # FLD AUTO: 16.29 K/UL — HIGH (ref 3.8–10.5)

## 2021-01-25 PROCEDURE — 93970 EXTREMITY STUDY: CPT | Mod: 26

## 2021-01-25 RX ORDER — ACETAMINOPHEN 500 MG
1000 TABLET ORAL ONCE
Refills: 0 | Status: COMPLETED | OUTPATIENT
Start: 2021-01-25 | End: 2021-01-25

## 2021-01-25 RX ORDER — TAMSULOSIN HYDROCHLORIDE 0.4 MG/1
0.4 CAPSULE ORAL ONCE
Refills: 0 | Status: COMPLETED | OUTPATIENT
Start: 2021-01-25 | End: 2021-01-25

## 2021-01-25 RX ORDER — METOCLOPRAMIDE HCL 10 MG
10 TABLET ORAL EVERY 6 HOURS
Refills: 0 | Status: COMPLETED | OUTPATIENT
Start: 2021-01-25 | End: 2021-01-27

## 2021-01-25 RX ORDER — ACETAMINOPHEN 500 MG
1000 TABLET ORAL ONCE
Refills: 0 | Status: COMPLETED | OUTPATIENT
Start: 2021-01-26 | End: 2021-01-26

## 2021-01-25 RX ORDER — CALCIUM GLUCONATE 100 MG/ML
1 VIAL (ML) INTRAVENOUS ONCE
Refills: 0 | Status: COMPLETED | OUTPATIENT
Start: 2021-01-25 | End: 2021-01-25

## 2021-01-25 RX ORDER — TAMSULOSIN HYDROCHLORIDE 0.4 MG/1
0.4 CAPSULE ORAL AT BEDTIME
Refills: 0 | Status: DISCONTINUED | OUTPATIENT
Start: 2021-01-26 | End: 2021-01-27

## 2021-01-25 RX ORDER — POTASSIUM CHLORIDE 20 MEQ
40 PACKET (EA) ORAL ONCE
Refills: 0 | Status: COMPLETED | OUTPATIENT
Start: 2021-01-25 | End: 2021-01-25

## 2021-01-25 RX ORDER — POLYETHYLENE GLYCOL 3350 17 G/17G
17 POWDER, FOR SOLUTION ORAL ONCE
Refills: 0 | Status: COMPLETED | OUTPATIENT
Start: 2021-01-25 | End: 2021-01-25

## 2021-01-25 RX ADMIN — POLYETHYLENE GLYCOL 3350 17 GRAM(S): 17 POWDER, FOR SOLUTION ORAL at 09:02

## 2021-01-25 RX ADMIN — Medication 100 GRAM(S): at 15:00

## 2021-01-25 RX ADMIN — POLYETHYLENE GLYCOL 3350 17 GRAM(S): 17 POWDER, FOR SOLUTION ORAL at 17:49

## 2021-01-25 RX ADMIN — Medication 10 MILLIGRAM(S): at 17:50

## 2021-01-25 RX ADMIN — TAMSULOSIN HYDROCHLORIDE 0.4 MILLIGRAM(S): 0.4 CAPSULE ORAL at 08:55

## 2021-01-25 RX ADMIN — Medication 400 MILLIGRAM(S): at 15:50

## 2021-01-25 RX ADMIN — PANTOPRAZOLE SODIUM 40 MILLIGRAM(S): 20 TABLET, DELAYED RELEASE ORAL at 11:47

## 2021-01-25 RX ADMIN — Medication 400 MILLIGRAM(S): at 00:59

## 2021-01-25 RX ADMIN — ENOXAPARIN SODIUM 40 MILLIGRAM(S): 100 INJECTION SUBCUTANEOUS at 23:15

## 2021-01-25 RX ADMIN — Medication 10 MILLIGRAM(S): at 15:03

## 2021-01-25 RX ADMIN — Medication 10 MILLIGRAM(S): at 23:14

## 2021-01-25 RX ADMIN — Medication 400 MILLIGRAM(S): at 08:54

## 2021-01-25 RX ADMIN — SENNA PLUS 2 TABLET(S): 8.6 TABLET ORAL at 23:15

## 2021-01-25 RX ADMIN — Medication 40 MILLIEQUIVALENT(S): at 09:01

## 2021-01-25 RX ADMIN — Medication 400 MILLIGRAM(S): at 23:14

## 2021-01-25 NOTE — PROGRESS NOTE ADULT - ASSESSMENT
46 y/o male with h/o low back pain s/p lumbar discectomy in 2007. Over the past 3 years, pt states he has been experiencing worsening low back pain with pain radiating into both legs with an occasional "hot sensation" in his calves. He is currently taking meloxicam which offer little to no pain relief. Presents for L3-L4 laminectomy, posterior lumbar interbody fusion, pedicle screw fixation, iliac crest bone graft.       *covid scheduled for 1/19 at [facility]. (06 Jan 2021 17:00)    PROCEDURE:  Adm 1/22 L3-5 PLIF  POD#3    PLAN:  Neuro: Cont HMV drain x1.  Afebrile x 24hrs, FU Blood and Urine>Cx-NGTD. Dopp LE-P. Acute blood loss anemia from surgery-improving. Low BP-Hold Flexeril, on Robaxin. Ileus-NPO except Meds. +BM today, cont IVF.  Leukocytosis from steroids in OR. 1/23 Lemons DC and reinserted for retention, Flomax started. Hypokalemia and hypocalcemia supp now FU.  Inc activity/OOB.     Respiratory: Patient instructed to use incentive spirometer [ X] YES [ ] NO              DVT ppx: [X ] SQL [ ] SQH and Venodynes [ ] Left [ ] Right [ X] Bilateral    Discharge Planning:  PT eval and pt is progressing towards home PT.    More than 30 minutes spent on total encounter: more than 50% of the visit was spent on educating the patient and family regarding condition, medications, follow up plans, signs and symptoms to be concerned with, preparing paperwork, and questions answered regarding discharge.

## 2021-01-25 NOTE — PROGRESS NOTE ADULT - ATTENDING COMMENTS
Pt doing well.  Ambulating with PT and sitting in chair.  Back pain improved.  Has constipation - on bowel regimen.

## 2021-01-26 LAB
ALBUMIN SERPL ELPH-MCNC: 3.2 G/DL — LOW (ref 3.3–5)
ALP SERPL-CCNC: 60 U/L — SIGNIFICANT CHANGE UP (ref 40–120)
ALT FLD-CCNC: 15 U/L — SIGNIFICANT CHANGE UP (ref 10–45)
ANION GAP SERPL CALC-SCNC: 14 MMOL/L — SIGNIFICANT CHANGE UP (ref 5–17)
AST SERPL-CCNC: 18 U/L — SIGNIFICANT CHANGE UP (ref 10–40)
BASOPHILS # BLD AUTO: 0.04 K/UL — SIGNIFICANT CHANGE UP (ref 0–0.2)
BASOPHILS NFR BLD AUTO: 0.3 % — SIGNIFICANT CHANGE UP (ref 0–2)
BILIRUB SERPL-MCNC: 0.8 MG/DL — SIGNIFICANT CHANGE UP (ref 0.2–1.2)
BUN SERPL-MCNC: 11 MG/DL — SIGNIFICANT CHANGE UP (ref 7–23)
CALCIUM SERPL-MCNC: 8.5 MG/DL — SIGNIFICANT CHANGE UP (ref 8.4–10.5)
CHLORIDE SERPL-SCNC: 101 MMOL/L — SIGNIFICANT CHANGE UP (ref 96–108)
CO2 SERPL-SCNC: 22 MMOL/L — SIGNIFICANT CHANGE UP (ref 22–31)
CREAT SERPL-MCNC: 0.75 MG/DL — SIGNIFICANT CHANGE UP (ref 0.5–1.3)
EOSINOPHIL # BLD AUTO: 0.56 K/UL — HIGH (ref 0–0.5)
EOSINOPHIL NFR BLD AUTO: 4.1 % — SIGNIFICANT CHANGE UP (ref 0–6)
GLUCOSE SERPL-MCNC: 88 MG/DL — SIGNIFICANT CHANGE UP (ref 70–99)
HCT VFR BLD CALC: 31.4 % — LOW (ref 39–50)
HGB BLD-MCNC: 10.2 G/DL — LOW (ref 13–17)
IMM GRANULOCYTES NFR BLD AUTO: 1 % — SIGNIFICANT CHANGE UP (ref 0–1.5)
LYMPHOCYTES # BLD AUTO: 26.2 % — SIGNIFICANT CHANGE UP (ref 13–44)
LYMPHOCYTES # BLD AUTO: 3.57 K/UL — HIGH (ref 1–3.3)
MCHC RBC-ENTMCNC: 29.1 PG — SIGNIFICANT CHANGE UP (ref 27–34)
MCHC RBC-ENTMCNC: 32.5 GM/DL — SIGNIFICANT CHANGE UP (ref 32–36)
MCV RBC AUTO: 89.5 FL — SIGNIFICANT CHANGE UP (ref 80–100)
MONOCYTES # BLD AUTO: 0.77 K/UL — SIGNIFICANT CHANGE UP (ref 0–0.9)
MONOCYTES NFR BLD AUTO: 5.6 % — SIGNIFICANT CHANGE UP (ref 2–14)
NEUTROPHILS # BLD AUTO: 8.57 K/UL — HIGH (ref 1.8–7.4)
NEUTROPHILS NFR BLD AUTO: 62.8 % — SIGNIFICANT CHANGE UP (ref 43–77)
NRBC # BLD: 0 /100 WBCS — SIGNIFICANT CHANGE UP (ref 0–0)
PLATELET # BLD AUTO: 272 K/UL — SIGNIFICANT CHANGE UP (ref 150–400)
POTASSIUM SERPL-MCNC: 3.6 MMOL/L — SIGNIFICANT CHANGE UP (ref 3.5–5.3)
POTASSIUM SERPL-SCNC: 3.6 MMOL/L — SIGNIFICANT CHANGE UP (ref 3.5–5.3)
PROT SERPL-MCNC: 5.9 G/DL — LOW (ref 6–8.3)
RBC # BLD: 3.51 M/UL — LOW (ref 4.2–5.8)
RBC # FLD: 12.8 % — SIGNIFICANT CHANGE UP (ref 10.3–14.5)
SODIUM SERPL-SCNC: 137 MMOL/L — SIGNIFICANT CHANGE UP (ref 135–145)
WBC # BLD: 13.65 K/UL — HIGH (ref 3.8–10.5)
WBC # FLD AUTO: 13.65 K/UL — HIGH (ref 3.8–10.5)

## 2021-01-26 PROCEDURE — 74018 RADEX ABDOMEN 1 VIEW: CPT | Mod: 26

## 2021-01-26 RX ORDER — ACETAMINOPHEN 500 MG
1000 TABLET ORAL ONCE
Refills: 0 | Status: COMPLETED | OUTPATIENT
Start: 2021-01-26 | End: 2021-01-26

## 2021-01-26 RX ORDER — MORPHINE SULFATE 50 MG/1
2 CAPSULE, EXTENDED RELEASE ORAL ONCE
Refills: 0 | Status: DISCONTINUED | OUTPATIENT
Start: 2021-01-26 | End: 2021-01-26

## 2021-01-26 RX ORDER — POTASSIUM CHLORIDE 20 MEQ
40 PACKET (EA) ORAL ONCE
Refills: 0 | Status: COMPLETED | OUTPATIENT
Start: 2021-01-26 | End: 2021-01-26

## 2021-01-26 RX ORDER — LANOLIN ALCOHOL/MO/W.PET/CERES
3 CREAM (GRAM) TOPICAL AT BEDTIME
Refills: 0 | Status: DISCONTINUED | OUTPATIENT
Start: 2021-01-26 | End: 2021-01-27

## 2021-01-26 RX ADMIN — Medication 10 MILLIGRAM(S): at 05:08

## 2021-01-26 RX ADMIN — Medication 400 MILLIGRAM(S): at 12:37

## 2021-01-26 RX ADMIN — Medication 400 MILLIGRAM(S): at 20:40

## 2021-01-26 RX ADMIN — Medication 400 MILLIGRAM(S): at 07:26

## 2021-01-26 RX ADMIN — POLYETHYLENE GLYCOL 3350 17 GRAM(S): 17 POWDER, FOR SOLUTION ORAL at 05:08

## 2021-01-26 RX ADMIN — Medication 10 MILLIGRAM(S): at 17:05

## 2021-01-26 RX ADMIN — TAMSULOSIN HYDROCHLORIDE 0.4 MILLIGRAM(S): 0.4 CAPSULE ORAL at 20:43

## 2021-01-26 RX ADMIN — LORATADINE 10 MILLIGRAM(S): 10 TABLET ORAL at 12:38

## 2021-01-26 RX ADMIN — PANTOPRAZOLE SODIUM 40 MILLIGRAM(S): 20 TABLET, DELAYED RELEASE ORAL at 12:37

## 2021-01-26 RX ADMIN — METHOCARBAMOL 500 MILLIGRAM(S): 500 TABLET, FILM COATED ORAL at 20:43

## 2021-01-26 RX ADMIN — SODIUM CHLORIDE 75 MILLILITER(S): 9 INJECTION, SOLUTION INTRAVENOUS at 20:45

## 2021-01-26 RX ADMIN — ENOXAPARIN SODIUM 40 MILLIGRAM(S): 100 INJECTION SUBCUTANEOUS at 20:43

## 2021-01-26 RX ADMIN — MORPHINE SULFATE 2 MILLIGRAM(S): 50 CAPSULE, EXTENDED RELEASE ORAL at 03:05

## 2021-01-26 RX ADMIN — SENNA PLUS 2 TABLET(S): 8.6 TABLET ORAL at 20:43

## 2021-01-26 RX ADMIN — POLYETHYLENE GLYCOL 3350 17 GRAM(S): 17 POWDER, FOR SOLUTION ORAL at 17:05

## 2021-01-26 RX ADMIN — Medication 3 MILLIGRAM(S): at 03:04

## 2021-01-26 RX ADMIN — SODIUM CHLORIDE 75 MILLILITER(S): 9 INJECTION, SOLUTION INTRAVENOUS at 05:08

## 2021-01-26 RX ADMIN — Medication 10 MILLIGRAM(S): at 12:37

## 2021-01-26 RX ADMIN — Medication 5 MILLIGRAM(S): at 17:04

## 2021-01-26 RX ADMIN — Medication 40 MILLIEQUIVALENT(S): at 12:37

## 2021-01-26 NOTE — DISCHARGE NOTE PROVIDER - NSDCFUADDINST_GEN_ALL_CORE_FT
Please make all necessary appointments and follow up. Please DO NOT take any Aspirin or NSAIDs (Advil, Aleve, Motrin, Ibuprofen) until cleared by Dr. Stephenson. Patient may shower however NO SOAPS / SHAMPOO over the surgical incision. No scrubbing. PAT DRY ONLY. No heavy lifting, bending, twisting and straining.   Please come to the emergency room for any of the following: pain uncontrolled by pain medication, leaking / bleeding from surgical site, altered mental status, fevers, chest pain and / or shortness of breath.

## 2021-01-26 NOTE — PROVIDER CONTACT NOTE (OTHER) - ASSESSMENT
Pt abd round but softly distended. pt had a BM on 1/25. Pt received Tylenol IV for pain, but stated minimal relief and wants something for sleep
pt 8/10 pain and oxy 10mg given but there was no change in pain -pt  still 8/10- there are no other forms pain meds ordered. pt is also febrile 100.1,  tylenol given at 0540, so temp was rechecked and it increased to 102.5
pt has 10/10 pain
pt with liquid stool not formed stool

## 2021-01-26 NOTE — PROVIDER CONTACT NOTE (OTHER) - REASON
elevated temp and increased pain
Pt c/o abd pain 9/10  and requesting something for pain & sleep
pt c/o pain but has no iv pain meds
liquid stool after Fleet enema

## 2021-01-26 NOTE — PROVIDER CONTACT NOTE (OTHER) - SITUATION
pt did not have a significant bm after the fleet enema
pt c/o pain not resolving and is febrile
pt is npo
s/p L.L.

## 2021-01-26 NOTE — DISCHARGE NOTE PROVIDER - HOSPITAL COURSE
44 y/o male with h/o low back pain s/p lumbar discectomy in 2007. Over the past 3 years, pt states he has been experiencing worsening low back pain with pain radiating into both legs with an occasional "hot sensation" in his calves. He is currently taking meloxicam which offer little to no pain relief. Patient was evaluated by Dr. Stephenson in his office for a surgical intervention.     On 1/22/21 patient was electively taken to the OR for s/p L3-L5 PLIF. Patient tolerated procedure well though had some hypotension post-operatively that resolved with boluses of normal saline, albumin and lactated ringers. Patients post-op course was complicated also by ileus which was seen on abdominal xray on 1/24/21. Patient was placed on NPO and given extensive bowel regimen, as well round o'clock anti-emetics. Patient had subsequent bowel movements. On 1/26/21 patient had a follow up abdominal xray which showed resolving ileus, patients diet was advanced as tolerated. He also had urinary retention for which a white was re-inserted on 1/23/21, patient was started on flomax. On 1/26/21 patient's white was removed and passed trial of void. Patient was seen and evaluated by Physical Therapy and deemed a candidate for home care with rolling walker and 3:1 commode.     On day of discharge patient is hemodynamically and neurologically stable. 46 y/o male with h/o low back pain s/p lumbar discectomy in 2007. Over the past 3 years, pt states he has been experiencing worsening low back pain with pain radiating into both legs with an occasional "hot sensation" in his calves. He is currently taking meloxicam which offer little to no pain relief. Patient was evaluated by Dr. Stephenson in his office for a surgical intervention.     On 1/22/21 patient was electively taken to the OR for s/p L3-L5 PLIF. Patient tolerated procedure well though had some hypotension post-operatively that resolved with boluses of normal saline, albumin and lactated ringers. Patients post-op course was complicated also by ileus which was seen on abdominal xray on 1/24/21. Patient was placed on NPO and given extensive bowel regimen, as well round o'clock anti-emetics. Patient had subsequent bowel movements. On 1/26/21 patient had a follow up abdominal xray which showed resolving ileus, patients diet was advanced as tolerated. He also had urinary retention for which a white was re-inserted on 1/23/21, patient was started on flomax. On 1/26/21 patient's white was removed and passed trial of void. Patient was seen and evaluated by Physical Therapy and deemed a candidate for home care with rolling walker and 3:1 commode.     On day of discharge patient is hemodynamically and neurologically stable.     iSTOP reference #[id number]

## 2021-01-26 NOTE — PROGRESS NOTE ADULT - ASSESSMENT
ASSESSMENT AND PLAN: 46 y/o male with h/o low back pain s/p lumbar discectomy in 2007. Over the past 3 years, pt states he has been experiencing worsening low back pain with pain radiating into both legs with an occasional "hot sensation" in his calves. He is currently taking meloxicam which offer little to no pain relief.     s/p L3-L5 PLIF POD 4    NEURO:   - Continue neuro checks q 4  - Monitor hemovac output  - Continue pain control with tylenol prn and oxycodone prn  - Continue Robaxin for muscle spasms prn  - Continue Melatonin for insomnia  - PT - home w/ home PT with rolling walker and 3:1 commode    PULM:   - Encourage incentive spirometry  - On room air, O2Sat>96%    CV:  - -160    ENDO:   - Goal Euglycemia, no issues despite being NPO    HEME/ONC:             1/26 CBC this morning showed improved leukocytosis          DVT ppx: SCDs, LE Dopps 1/25 - negative, SQL    RENAL:   - On LR@75 while NPO  - BMP today reveals K 3.6, supplemented with KCl 40millequivalent PO x 1    ID:   - Afebrile  - Prior fever w/u on 1/23 shows NGTD  - 1/19 COVID neg  - 1/7 MRSA/MSSA swab negative    GI:    - Was NPO for ileus seen on Abx Xray on 1/24, patient has since had 4 bowel movements, he states feeling better though still mildly tender on palpation in his lower quadrant abdomen. Did a follow up Abx Xray today which shows improvement, will start patient on clear liquid diet and monitor  - Continue senna, miralax, and added dulcolax PO  - Continue reglan x 8 doses     DISCHARGE PLANNING:   PT - home w/ home PT with rolling walker and 3:1 commode    Plan to be discussed w/ Dr. Stephenson  [id number]    ASSESSMENT AND PLAN: 46 y/o male with h/o low back pain s/p lumbar discectomy in 2007. Over the past 3 years, pt states he has been experiencing worsening low back pain with pain radiating into both legs with an occasional "hot sensation" in his calves. He is currently taking meloxicam which offer little to no pain relief.     s/p L3-L5 PLIF POD 4    NEURO:   - Continue neuro checks q 4  - Monitor hemovac output  - Continue pain control with tylenol prn and oxycodone prn  - Continue Robaxin for muscle spasms prn  - Continue Melatonin for insomnia  - PT - home w/ home PT with rolling walker and 3:1 commode    PULM:   - Encourage incentive spirometry  - On room air, O2Sat>96%    CV:  - -160    ENDO:   - Goal Euglycemia, no issues despite being NPO    HEME/ONC:             1/26 CBC this morning showed improved leukocytosis          DVT ppx: SCDs, LE Dopps 1/25 - negative, SQL    RENAL:   - On LR@75 while NPO  - BMP today reveals K 3.6, supplemented with KCl 40millequivalent PO x 1    ID:   - Afebrile  - Prior fever w/u on 1/23 shows NGTD  - 1/19 COVID neg  - 1/7 MRSA/MSSA swab negative    GI:    - Was NPO for ileus seen on Abd Xray on 1/24, patient has since had 4 bowel movements, he states feeling better though still mildly tender on palpation in his lower quadrant abdomen. Did a follow up Abd Xray today which shows improvement, will start patient on clear liquid diet and monitor  - Continue senna, miralax, and added dulcolax PO  - Continue reglan x 8 doses     DISCHARGE PLANNING:   PT - home w/ home PT with rolling walker and 3:1 commode    Plan to be discussed w/ Dr. Stephenson  [id number]    ASSESSMENT AND PLAN: 46 y/o male with h/o low back pain s/p lumbar discectomy in 2007. Over the past 3 years, pt states he has been experiencing worsening low back pain with pain radiating into both legs with an occasional "hot sensation" in his calves. He is currently taking meloxicam which offer little to no pain relief.     s/p L3-L5 PLIF POD 4    NEURO:   - Continue neuro checks q 4  - Monitor hemovac output  - Continue pain control with tylenol prn and oxycodone prn  - Continue Robaxin for muscle spasms prn  - Continue Melatonin for insomnia  - PT - home w/ home PT with rolling walker and 3:1 commode    PULM:   - Encourage incentive spirometry  - On room air, O2Sat>96%    CV:  - -160    ENDO:   - Goal Euglycemia, no issues despite being NPO    HEME/ONC:             1/26 CBC this morning showed improved leukocytosis          DVT ppx: SCDs, LE Dopps 1/25 - negative, SQL    RENAL:   - On LR@75 while NPO  - BMP today reveals K 3.6, supplemented with KCl 40millequivalent PO x 1    ID:   - Afebrile  - Prior fever w/u on 1/23 shows NGTD  - 1/19 COVID neg  - 1/7 MRSA/MSSA swab negative    GI:    - Was NPO for ileus seen on Abd Xray on 1/24, patient has since had 4 bowel movements, he states feeling better though still mildly tender on palpation in his lower quadrant abdomen. Did a follow up Abd Xray today which shows improvement, will start patient on clear liquid diet and monitor  - Continue senna, miralax, and added dulcolax PO  - Continue reglan x 8 doses     :  - White re-inserted on 1/23 for urinary retention, flomax started yesterday. Will d/c white today and attempt TOV.     DISCHARGE PLANNING:   PT - home w/ home PT with rolling walker and 3:1 commode    Plan to be discussed w/ Dr. Stephenson  [id number]

## 2021-01-26 NOTE — DISCHARGE NOTE PROVIDER - CARE PROVIDER_API CALL
Quinn Stephenson  NEUROSURGERY  [address] [place] [address], [facility] [address]  [place] [address]  Phone: [phone]  Fax: [phone]  Follow Up Time:

## 2021-01-26 NOTE — DISCHARGE NOTE PROVIDER - NSDCMRMEDTOKEN_GEN_ALL_CORE_FT
Commode 3:1: S/P L3-5PLIF  loratadine 10 mg oral tablet: 1 tab(s) orally once a day  meloxicam 15 mg oral tablet: 1 tab(s) orally once a day (at bedtime)  Rolling Walker: S/P L4-5 PLIF      Tylenol 325 mg oral capsule: 1 cap(s) orally 3 times a day, As Needed   acetaminophen 325 mg oral tablet: 2 tab(s) orally every 6 hours, As needed, Temp greater or equal to 38C (100.4F), Mild Pain (1 - 3)  bisacodyl 5 mg oral delayed release tablet: 1 tab(s) orally every 12 hours, As needed, Constipation  Commode 3:1: S/P L3-5PLIF  loratadine 10 mg oral tablet: 1 tab(s) orally once a day  melatonin 3 mg oral tablet: 1 tab(s) orally once a day (at bedtime), As needed, Insomnia  methocarbamol 500 mg oral tablet: 1 tab(s) orally every 8 hours, As needed, Muscle Spasm  oxyCODONE 5 mg oral tablet: 1 tab(s) orally every 6 hours, As Needed -Moderate Pain (4 - 6) MDD:4 tablets daily  pantoprazole 40 mg oral delayed release tablet: 1 tab(s) orally once a day (before a meal)   polyethylene glycol 3350 oral powder for reconstitution: 17 gram(s) orally 2 times a day  Rolling Walker: S/P L4-5 PLIF      senna oral tablet: 2 tab(s) orally once a day (at bedtime)  tamsulosin 0.4 mg oral capsule: 1 cap(s) orally once a day (at bedtime)

## 2021-01-26 NOTE — DISCHARGE NOTE PROVIDER - NSDCCPCAREPLAN_GEN_ALL_CORE_FT
PRINCIPAL DISCHARGE DIAGNOSIS  Diagnosis: Lumbar herniated disc  Assessment and Plan of Treatment: Lumbar herniated disc  s/p L3-L5 PLIF 1/22/21  Please make an appointment and follow up with Dr. Stephenson in one week after discharge from the hospital. Patient may shower however please keep your incision clean and dry. No scrubbing, pat dry only. No heavy lifting, bending, twisting and straining.   Please make an appointment and follow up with your primary care provider in one week after discharge.      SECONDARY DISCHARGE DIAGNOSES  Diagnosis: Ileus  Assessment and Plan of Treatment: You had post-op ileus that has since resolved. Please make an appointment and follow up with your primary care provider in one week from discharge from hospital. Please continue taking your bowel regimen medications as needed for constipation.     PRINCIPAL DISCHARGE DIAGNOSIS  Diagnosis: Lumbar herniated disc  Assessment and Plan of Treatment: Lumbar herniated disc  s/p L3-L5 PLIF 1/22/21  Please make an appointment and follow up with Dr. Stephenson in one week after discharge from the hospital. Patient may shower however please keep your incision clean and dry. Patient has surgical staples, they will be removed on his follow up appointment with Dr. Stephenson at his office. No scrubbing, pat dry only. No heavy lifting, bending, twisting and straining.   Please make an appointment and follow up with your primary care provider in one week after discharge.      SECONDARY DISCHARGE DIAGNOSES  Diagnosis: Urinary retention  Assessment and Plan of Treatment: You had an episode of urinary retention and had to have a white re-inserted and started on Flomax (Tamsulosin). You passed trial of void on 1/26/21 and has been urinating independently. Please continue Tamsulosin and follow up with your primary care provider in one week from discharge from the hospital.    Diagnosis: Ileus  Assessment and Plan of Treatment: You had post-op ileus that has since resolved. Please make an appointment and follow up with your primary care provider in one week from discharge from hospital. Please continue taking your bowel regimen medications as needed for constipation.

## 2021-01-26 NOTE — PROVIDER CONTACT NOTE (OTHER) - ACTION/TREATMENT ORDERED:
Provider stated to have pt use the I/s 10x/hr and she will order a muscle relaxant for the increased pain. the team will come assess pt this am as well
MD does not want pt to receive narcotics so she stated he will order iv Tylenol.
Spoke with dr. Kinney and made him aware of pt req. Morph 2mg IVP x1 ordered for pain & Melatonin prn sleep. Pt resting comfortably after receiving morph.
MD stated he will order a suppositiory and make him completely NPO- no po meds at all.

## 2021-01-27 VITALS
SYSTOLIC BLOOD PRESSURE: 120 MMHG | TEMPERATURE: 99 F | OXYGEN SATURATION: 99 % | HEART RATE: 98 BPM | DIASTOLIC BLOOD PRESSURE: 76 MMHG | RESPIRATION RATE: 18 BRPM

## 2021-01-27 PROCEDURE — 87040 BLOOD CULTURE FOR BACTERIA: CPT

## 2021-01-27 PROCEDURE — 85018 HEMOGLOBIN: CPT

## 2021-01-27 PROCEDURE — 81001 URINALYSIS AUTO W/SCOPE: CPT

## 2021-01-27 PROCEDURE — 80053 COMPREHEN METABOLIC PANEL: CPT

## 2021-01-27 PROCEDURE — 85014 HEMATOCRIT: CPT

## 2021-01-27 PROCEDURE — 85025 COMPLETE CBC W/AUTO DIFF WBC: CPT

## 2021-01-27 PROCEDURE — 82330 ASSAY OF CALCIUM: CPT

## 2021-01-27 PROCEDURE — 84295 ASSAY OF SERUM SODIUM: CPT

## 2021-01-27 PROCEDURE — 76000 FLUOROSCOPY <1 HR PHYS/QHP: CPT

## 2021-01-27 PROCEDURE — 83605 ASSAY OF LACTIC ACID: CPT

## 2021-01-27 PROCEDURE — 82565 ASSAY OF CREATININE: CPT

## 2021-01-27 PROCEDURE — 97162 PT EVAL MOD COMPLEX 30 MIN: CPT

## 2021-01-27 PROCEDURE — 82435 ASSAY OF BLOOD CHLORIDE: CPT

## 2021-01-27 PROCEDURE — 80048 BASIC METABOLIC PNL TOTAL CA: CPT

## 2021-01-27 PROCEDURE — 93970 EXTREMITY STUDY: CPT

## 2021-01-27 PROCEDURE — C1889: CPT

## 2021-01-27 PROCEDURE — 87086 URINE CULTURE/COLONY COUNT: CPT

## 2021-01-27 PROCEDURE — P9045: CPT

## 2021-01-27 PROCEDURE — 97116 GAIT TRAINING THERAPY: CPT

## 2021-01-27 PROCEDURE — 82803 BLOOD GASES ANY COMBINATION: CPT

## 2021-01-27 PROCEDURE — 82947 ASSAY GLUCOSE BLOOD QUANT: CPT

## 2021-01-27 PROCEDURE — 85027 COMPLETE CBC AUTOMATED: CPT

## 2021-01-27 PROCEDURE — 71045 X-RAY EXAM CHEST 1 VIEW: CPT

## 2021-01-27 PROCEDURE — 74018 RADEX ABDOMEN 1 VIEW: CPT

## 2021-01-27 PROCEDURE — C1713: CPT

## 2021-01-27 PROCEDURE — 84132 ASSAY OF SERUM POTASSIUM: CPT

## 2021-01-27 PROCEDURE — 97530 THERAPEUTIC ACTIVITIES: CPT

## 2021-01-27 RX ORDER — PANTOPRAZOLE SODIUM 20 MG/1
1 TABLET, DELAYED RELEASE ORAL
Qty: 30 | Refills: 0
Start: 2021-01-27 | End: 2021-02-25

## 2021-01-27 RX ORDER — OXYCODONE HYDROCHLORIDE 5 MG/1
1 TABLET ORAL
Qty: 20 | Refills: 0
Start: 2021-01-27 | End: 2021-01-31

## 2021-01-27 RX ORDER — METHOCARBAMOL 500 MG/1
1 TABLET, FILM COATED ORAL
Qty: 15 | Refills: 0
Start: 2021-01-27 | End: 2021-01-31

## 2021-01-27 RX ORDER — LANOLIN ALCOHOL/MO/W.PET/CERES
1 CREAM (GRAM) TOPICAL
Qty: 0 | Refills: 0 | DISCHARGE
Start: 2021-01-27

## 2021-01-27 RX ORDER — ACETAMINOPHEN 500 MG
2 TABLET ORAL
Qty: 0 | Refills: 0 | DISCHARGE
Start: 2021-01-27

## 2021-01-27 RX ORDER — ACETAMINOPHEN 500 MG
1 TABLET ORAL
Qty: 0 | Refills: 0 | DISCHARGE

## 2021-01-27 RX ORDER — TAMSULOSIN HYDROCHLORIDE 0.4 MG/1
1 CAPSULE ORAL
Qty: 30 | Refills: 0
Start: 2021-01-27 | End: 2021-02-25

## 2021-01-27 RX ORDER — MELOXICAM 15 MG/1
1 TABLET ORAL
Qty: 0 | Refills: 0 | DISCHARGE

## 2021-01-27 RX ORDER — POLYETHYLENE GLYCOL 3350 17 G/17G
17 POWDER, FOR SOLUTION ORAL
Qty: 0 | Refills: 0 | DISCHARGE
Start: 2021-01-27

## 2021-01-27 RX ORDER — SENNA PLUS 8.6 MG/1
2 TABLET ORAL
Qty: 0 | Refills: 0 | DISCHARGE
Start: 2021-01-27

## 2021-01-27 RX ADMIN — POLYETHYLENE GLYCOL 3350 17 GRAM(S): 17 POWDER, FOR SOLUTION ORAL at 04:19

## 2021-01-27 RX ADMIN — Medication 10 MILLIGRAM(S): at 10:58

## 2021-01-27 RX ADMIN — Medication 10 MILLIGRAM(S): at 00:16

## 2021-01-27 RX ADMIN — Medication 10 MILLIGRAM(S): at 05:45

## 2021-01-27 RX ADMIN — PANTOPRAZOLE SODIUM 40 MILLIGRAM(S): 20 TABLET, DELAYED RELEASE ORAL at 10:58

## 2021-01-27 RX ADMIN — OXYCODONE HYDROCHLORIDE 10 MILLIGRAM(S): 5 TABLET ORAL at 14:04

## 2021-01-27 RX ADMIN — OXYCODONE HYDROCHLORIDE 10 MILLIGRAM(S): 5 TABLET ORAL at 04:19

## 2021-01-27 RX ADMIN — LORATADINE 10 MILLIGRAM(S): 10 TABLET ORAL at 10:58

## 2021-01-27 RX ADMIN — Medication 5 MILLIGRAM(S): at 04:19

## 2021-01-27 RX ADMIN — Medication 3 MILLIGRAM(S): at 00:16

## 2021-01-27 RX ADMIN — OXYCODONE HYDROCHLORIDE 10 MILLIGRAM(S): 5 TABLET ORAL at 09:03

## 2021-01-27 RX ADMIN — METHOCARBAMOL 500 MILLIGRAM(S): 500 TABLET, FILM COATED ORAL at 04:19

## 2021-01-27 NOTE — PROGRESS NOTE ADULT - ATTENDING COMMENTS
Pt doing well.  Incisional pain improved.  Ambulating with PT, including up and down stairs.  DC home.  RTC 1 week,

## 2021-01-27 NOTE — PROGRESS NOTE ADULT - SUBJECTIVE AND OBJECTIVE BOX
44 y/o male with h/o low back pain s/p lumbar discectomy in . Over the past 3 years, pt states he has been experiencing worsening low back pain with pain radiating into both legs with an occasional "hot sensation" in his calves. He is currently taking meloxicam which offer little to no pain relief. Presents for L3-L4 laminectomy, posterior lumbar interbody fusion, pedicle screw fixation, iliac crest bone graft.      S/P L3-L5 PLIF    OVERNIGHT EVENTS: febrile, received Tylenol and pancultures sent    VITAL SIGNS:   T(C): 37.6 (2021 07:52), Max: 39.2 (2021 06:38)  T(F): 99.7 (2021 07:52), Max: 102.5 (2021 06:38)  HR: 107 (2021 07:52) (94 - 109)  BP: 120/81 (2021 07:52) (96/62 - 120/81)  BP(mean): --  RR: 18 (2021 07:52) (16 - 18)  SpO2: 95% (2021 07:52) (95% - 99%)    DRAINS: Rt HMV (91)                Lt HMV (340)    PHYSICAL EXAM:    Constitutional: No Acute Distress     Neurological: AOx3, Following Commands, Moving all Extremities     Motor exam:          Upper extremity                         Delt     Bicep     Tricep    HG                                                 R         5/5        5/5        5/5       5/5                                               L          5/5        5/5        5/5       5/5          Lower extremity                        HF         KF        KE       DF         PF                                                  R        4+/5        5/5        5/5       5/5         5/5 (pain limiting)                                               L        4+/5        5/5       5/5       5/5          5/5                                                 Sensation: [X] intact to light touch  [] decreased:     Pulmonary: Clear to Auscultation, No rales, No rhonchi, No wheezes     Cardiovascular: S1, S2, Regular rate and rhythm     Gastrointestinal: Soft, Non-tender, mildly distended     Extremities: No calf tenderness     Incision: dressing in place, clean and dry    LABS:                        10.2   16.17 )-----------( 212      ( 2021 07:12 )             31.7    01-23    136  |  105  |  10  ----------------------------<  110<H>  3.7   |  22  |  0.78    Ca    8.3<L>      2021 07:49    Urinalysis Basic - ( 2021 20:38 )    Color: Colorless / Appearance: Clear / S.014 / pH: x  Gluc: x / Ketone: Negative  / Bili: Negative / Urobili: Negative   Blood: x / Protein: Negative / Nitrite: Negative   Leuk Esterase: Negative / RBC: 5 /hpf / WBC 2 /HPF   Sq Epi: x / Non Sq Epi: 1 /hpf / Bacteria: Negative        MEDICATIONS:  Antibiotics:    Neuro:  acetaminophen   Tablet .. 650 milliGRAM(s) Oral every 6 hours PRN Temp greater or equal to 38C (100.4F), Mild Pain (1 - 3)  acetaminophen  IVPB .. 1000 milliGRAM(s) IV Intermittent once  methocarbamol 500 milliGRAM(s) Oral every 8 hours PRN Muscle Spasm  oxyCODONE    IR 5 milliGRAM(s) Oral every 4 hours PRN Moderate Pain (4 - 6)  oxyCODONE    IR 10 milliGRAM(s) Oral every 4 hours PRN Severe Pain (7 - 10)    Cardiac:    Pulm:  loratadine 10 milliGRAM(s) Oral daily    GI/:  polyethylene glycol 3350 17 Gram(s) Oral two times a day  senna 2 Tablet(s) Oral at bedtime    Other:   enoxaparin Injectable 40 milliGRAM(s) SubCutaneous at bedtime  influenza   Vaccine 0.5 milliLiter(s) IntraMuscular once  lactated ringers. 1000 milliLiter(s) IV Continuous <Continuous>    DIET: [X] Regular [] CCD [] Renal [] Puree [] Dysphagia [] Tube Feeds:     IMAGING:    Xray Chest 1 View- PORTABLE-Urgent (Xray Chest 1 View- PORTABLE-Urgent .) (21 @ 20:46  Clear lungs        
Patient seen and examined s/p L3-5 PLIF.     Doing well. Awake, alert, oriented x 3. HAMMOND strongly, LE 4+/5 pain limited. SILT.     T(C): 36 (01-22-21 @ 16:08), Max: 36.7 (01-22-21 @ 05:37)  HR: 71 (01-22-21 @ 18:00) (66 - 83)  BP: 89/50 (01-22-21 @ 18:00) (67/41 - 137/91)  RR: 14 (01-22-21 @ 18:00) (14 - 18)  SpO2: 100% (01-22-21 @ 18:00) (98% - 100%)                          10.8   12.39 )-----------( 219      ( 22 Jan 2021 16:43 )             31.9     01-22    139  |  109<H>  |  8   ----------------------------<  167<H>  3.8   |  20<L>  |  0.74    Ca    7.4<L>      22 Jan 2021 16:43              CAPILLARY BLOOD GLUCOSE        
SUBJECTIVE: HPI:  44 y/o male with h/o low back pain s/p lumbar discectomy in 2007. Over the past 3 years, pt states he has been experiencing worsening low back pain with pain radiating into both legs with an occasional "hot sensation" in his calves. He is currently taking meloxicam which offer little to no pain relief. Presents for L3-L4 laminectomy, posterior lumbar interbody fusion, pedicle screw fixation, iliac crest bone graft.       [facility] (06 Jan 2021 17:00)      OVERNIGHT EVENTS: No acute events overnight, patient remained to be NPO overnight for ileus. Patient has since has had 4 bowel movements from yesterday till this morning, patient states feeling better. Otherwise no complaints, pain controlled with current regimen.     Vital Signs Last 24 Hrs  T(C): 37.2 (26 Jan 2021 08:44), Max: 37.2 (26 Jan 2021 08:44)  T(F): 99 (26 Jan 2021 08:44), Max: 99 (26 Jan 2021 08:44)  HR: 94 (26 Jan 2021 08:44) (94 - 108)  BP: 115/78 (26 Jan 2021 08:44) (100/67 - 115/78)  BP(mean): --  RR: 16 (26 Jan 2021 08:44) (16 - 16)  SpO2: 99% (26 Jan 2021 08:44) (97% - 99%)    DRAINS: One left HMV 110cc for 24hrs    PHYSICAL EXAM:    Constitutional: No Acute Distress     Neurological: AOx3, Following Commands, Moving all Extremities     Motor exam:          Upper extremity                         Delt     Bicep     Tricep    HG                                                 R         5/5        5/5        5/5       5/5                                               L          5/5        5/5        5/5       5/5          Lower extremity                        HF         KF        KE       DF         PF intermittently pain limited                                                 R        4+/5        5/5        5/5       5/5         5/5                                               L         4+/5        5/5       5/5       5/5          5/5                                                 Sensation: [x] intact to light touch  [] decreased:     Pulmonary: Clear to Auscultation, No rales, No rhonchi, No wheezes     Cardiovascular: S1, S2, Regular rate and rhythm     Gastrointestinal: Soft, distended, very mild tenderness on palpation, +bowel sounds on all 4 quadrants    Extremities: No calf tenderness     Incision: Aquacel dressing in place, C/D/I    LABS:                        10.2   13.65 )-----------( 272      ( 26 Jan 2021 07:14 )             31.4    01-26    137  |  101  |  11  ----------------------------<  88  3.6   |  22  |  0.75    Ca    8.5      26 Jan 2021 07:12    TPro  5.9<L>  /  Alb  3.2<L>  /  TBili  0.8  /  DBili  x   /  AST  18  /  ALT  15  /  AlkPhos  60  01-26      MEDICATIONS:  Antibiotics:    Neuro:  acetaminophen   Tablet .. 650 milliGRAM(s) Oral every 6 hours PRN Temp greater or equal to 38C (100.4F), Mild Pain (1 - 3)  melatonin 3 milliGRAM(s) Oral at bedtime PRN Insomnia  methocarbamol 500 milliGRAM(s) Oral every 8 hours PRN Muscle Spasm  metoclopramide Injectable 10 milliGRAM(s) IV Push every 6 hours  oxyCODONE    IR 5 milliGRAM(s) Oral every 4 hours PRN Moderate Pain (4 - 6)  oxyCODONE    IR 10 milliGRAM(s) Oral every 4 hours PRN Severe Pain (7 - 10)    Cardiac:  tamsulosin 0.4 milliGRAM(s) Oral at bedtime    Pulm:  loratadine 10 milliGRAM(s) Oral daily    GI/:  bisacodyl Suppository 10 milliGRAM(s) Rectal daily PRN Constipation  pantoprazole  Injectable 40 milliGRAM(s) IV Push daily  polyethylene glycol 3350 17 Gram(s) Oral two times a day  senna 2 Tablet(s) Oral at bedtime    Other:   enoxaparin Injectable 40 milliGRAM(s) SubCutaneous at bedtime  influenza   Vaccine 0.5 milliLiter(s) IntraMuscular once  lactated ringers. 1000 milliLiter(s) IV Continuous <Continuous>  potassium chloride    Tablet ER 40 milliEquivalent(s) Oral once    DIET: [] Regular [] CCD [] Renal [] Puree [] Dysphagia [] Tube Feeds [x] NPO    IMAGING:   < from: Xray Abdomen 1 View PORTABLE -Routine (Xray Abdomen 1 View PORTABLE -Routine .) (01.24.21 @ 17:56) >  IMPRESSION:  Numerous mildly dilated loops of small bowel, suggestive of ileus.      BENJY LUCAS MD; Resident Radiology  This document has been electronically signed.  FRED BRIONES MD; Attending Radiologist  This document has been electronically signed. Jan 25 2021  8:57AM    < end of copied text >  
SUBJECTIVE: HPI:  46 y/o male with h/o low back pain s/p lumbar discectomy in 2007. Over the past 3 years, pt states he has been experiencing worsening low back pain with pain radiating into both legs with an occasional "hot sensation" in his calves. He is currently taking meloxicam which offer little to no pain relief. Presents for L3-L4 laminectomy, posterior lumbar interbody fusion, pedicle screw fixation, iliac crest bone graft.       *covid scheduled for 1/19 at [facility]. (06 Jan 2021 17:00)      OVERNIGHT EVENTS: Overnight patient had further bowel movements, tolerated his liquid diet states feeling better.     Vital Signs Last 24 Hrs  T(C): 36.8 (27 Jan 2021 08:43), Max: 37.1 (27 Jan 2021 04:06)  T(F): 98.2 (27 Jan 2021 08:43), Max: 98.7 (27 Jan 2021 04:06)  HR: 109 (27 Jan 2021 08:43) (89 - 109)  BP: 104/71 (27 Jan 2021 08:43) (104/71 - 121/80)  BP(mean): --  RR: 16 (27 Jan 2021 08:43) (16 - 16)  SpO2: 98% (27 Jan 2021 08:43) (97% - 100%)    DRAINS: Left HMV 30cc/24hrs, d/c'd today - no issues, closed exit site with steri strips    PHYSICAL EXAM:    Constitutional: No Acute Distress     Neurological: AOx3, Following Commands, Moving all Extremities     Motor exam:          Upper extremity                         Delt     Bicep     Tricep    HG                                                 R         5/5        5/5        5/5       5/5                                               L          5/5        5/5        5/5       5/5          Lower extremity                        HF         KF        KE       DF         PF   intermittently pain limited                                               R        4+/5        5/5        5/5       5/5         5/5                                               L         4+/5        5/5       5/5       5/5          5/5                                                 Sensation: [x] intact to light touch  [] decreased:     Pulmonary: Clear to Auscultation, No rales, No rhonchi, No wheezes     Cardiovascular: S1, S2, Regular rate and rhythm     Gastrointestinal: Soft, Non-tender, mildly distended, +BS x 4    Extremities: No calf tenderness     Incision: Staples on lower back surgical incision, placed new dressing with gauze and tegaderm - C/D/I    LABS:                        10.2   13.65 )-----------( 272      ( 26 Jan 2021 07:14 )             31.4    01-26    137  |  101  |  11  ----------------------------<  88  3.6   |  22  |  0.75    Ca    8.5      26 Jan 2021 07:12    TPro  5.9<L>  /  Alb  3.2<L>  /  TBili  0.8  /  DBili  x   /  AST  18  /  ALT  15  /  AlkPhos  60  01-26      MEDICATIONS:  Antibiotics:    Neuro:  acetaminophen   Tablet .. 650 milliGRAM(s) Oral every 6 hours PRN Temp greater or equal to 38C (100.4F), Mild Pain (1 - 3)  melatonin 3 milliGRAM(s) Oral at bedtime PRN Insomnia  methocarbamol 500 milliGRAM(s) Oral every 8 hours PRN Muscle Spasm  metoclopramide Injectable 10 milliGRAM(s) IV Push every 6 hours  oxyCODONE    IR 5 milliGRAM(s) Oral every 4 hours PRN Moderate Pain (4 - 6)  oxyCODONE    IR 10 milliGRAM(s) Oral every 4 hours PRN Severe Pain (7 - 10)    Cardiac:  tamsulosin 0.4 milliGRAM(s) Oral at bedtime    Pulm:  loratadine 10 milliGRAM(s) Oral daily    GI/:  bisacodyl 5 milliGRAM(s) Oral every 12 hours PRN Constipation  bisacodyl Suppository 10 milliGRAM(s) Rectal daily PRN Constipation  pantoprazole  Injectable 40 milliGRAM(s) IV Push daily  polyethylene glycol 3350 17 Gram(s) Oral two times a day  senna 2 Tablet(s) Oral at bedtime    Other:   enoxaparin Injectable 40 milliGRAM(s) SubCutaneous at bedtime  influenza   Vaccine 0.5 milliLiter(s) IntraMuscular once  lactated ringers. 1000 milliLiter(s) IV Continuous <Continuous>    DIET: [x - Advance as tolerated] Regular [] CCD [] Renal [] Puree [] Dysphagia [] Tube Feeds:     IMAGING:   < from: Xray Abdomen 1 View PORTABLE -Urgent (Xray Abdomen 1 View PORTABLE -Urgent .) (01.26.21 @ 09:56) >  IMPRESSION:  Decreased dilatation of small bowel loops, consistent with resolving ileus. Mild persistent sigmoid and transverse colonic air distention.    BENJY LUCAS MD; Resident Radiology  This document has been electronically signed.  STAN TRINH MD; Attending Radiologist  This document has been electronically signed. Jan 26 2021  2:55PM    < end of copied text >  
HPI:  46 y/o male with h/o low back pain s/p lumbar discectomy in 2007. Over the past 3 years, pt states he has been experiencing worsening low back pain with pain radiating into both legs with an occasional "hot sensation" in his calves. He is currently taking meloxicam which offer little to no pain relief. Presents for L3-L4 laminectomy, posterior lumbar interbody fusion, pedicle screw fixation, iliac crest bone graft.       *covid scheduled for 1/19 at [facility] beds at the [facility]. (06 Jan 2021 17:00)    OVERNIGHT EVENTS:  Vital Signs Last 24 Hrs  T(C): 36.7 (23 Jan 2021 09:31), Max: 36.7 (23 Jan 2021 03:55)  T(F): 98.1 (23 Jan 2021 09:31), Max: 98.1 (23 Jan 2021 03:55)  HR: 90 (23 Jan 2021 09:31) (58 - 98)  BP: 90/59 (23 Jan 2021 09:31) (67/41 - 120/62)  BP(mean): 72 (23 Jan 2021 02:32) (47 - 85)  RR: 16 (23 Jan 2021 09:31) (14 - 18)  SpO2: 97% (23 Jan 2021 09:31) (97% - 100%)    I&O's Detail    22 Jan 2021 07:01  -  23 Jan 2021 07:00  --------------------------------------------------------  IN:    IV PiggyBack: 3650 mL    Lactated Ringers: 750 mL    Lactated Ringers: 300 mL    Oral Fluid: 850 mL  Total IN: 5550 mL    OUT:    Accordian (mL): 80 mL    Accordian (mL): 265 mL    Indwelling Catheter - Urethral (mL): 1745 mL  Total OUT: 2090 mL    Total NET: 3460 mL      23 Jan 2021 07:01  -  23 Jan 2021 09:45  --------------------------------------------------------  IN:  Total IN: 0 mL    OUT:    Accordian (mL): 40 mL    Accordian (mL): 1 mL    Indwelling Catheter - Urethral (mL): 650 mL  Total OUT: 691 mL    Total NET: -691 mL        I&O's Summary    22 Jan 2021 07:01  -  23 Jan 2021 07:00  --------------------------------------------------------  IN: 5550 mL / OUT: 2090 mL / NET: 3460 mL    23 Jan 2021 07:01  -  23 Jan 2021 09:45  --------------------------------------------------------  IN: 0 mL / OUT: 691 mL / NET: -691 mL        PHYSICAL EXAM:  Neurological:    Motor exam:         [x] Upper extremity                 D             B          T          WE       WF      HI                                               R         5/5        5/5        5/5       5/5     5/5       5/5                                               L          5/5        5/5        5/5       5/5     5/5       5/5         [x] Lower extremity                Ps          Ha        Quad    EHL        FHL                                               R        5/5        5/5        5/5       5/5         5/5                                               L         5/5        5/5       5/5       5/5          5/5                                                        [] warm well perfused; capillary refill <3 seconds     Sensation: [x] intact to light touch  [] decreased:     Gait NT    Cardiovascular:  Respiratory:  Gastrointestinal:  Genitourinary:  Extremities:  Incision/Wound: Clean and dry    TUBES/LINES:  [] CVC  [] A-line  [] Lumbar Drain  [] Ventriculostomy  [] Other    DIET:  [] NPO  [] Mechanical  [] Tube feeds    LABS:                        9.7    15.56 )-----------( 198      ( 23 Jan 2021 07:49 )             29.0     01-23    136  |  105  |  10  ----------------------------<  110<H>  3.7   |  22  |  0.78    Ca    8.3<L>      23 Jan 2021 07:49              CAPILLARY BLOOD GLUCOSE              Drug Levels: [] N/A    CSF Analysis: [] N/A      Allergies    No Known Allergies    Intolerances      MEDICATIONS:  Antibiotics:  ceFAZolin   IVPB 2000 milliGRAM(s) IV Intermittent once    Neuro:  acetaminophen   Tablet .. 650 milliGRAM(s) Oral every 6 hours PRN  cyclobenzaprine 5 milliGRAM(s) Oral every 8 hours  oxyCODONE    IR 5 milliGRAM(s) Oral every 4 hours PRN  oxyCODONE    IR 10 milliGRAM(s) Oral every 4 hours PRN    Anticoagulation:    OTHER:  influenza   Vaccine 0.5 milliLiter(s) IntraMuscular once  loratadine 10 milliGRAM(s) Oral daily  polyethylene glycol 3350 17 Gram(s) Oral two times a day    IVF:  lactated ringers. 1000 milliLiter(s) IV Continuous <Continuous>    CULTURES:    RADIOLOGY & ADDITIONAL TESTS:      ASSESSMENT:  HPI:  46 y/o male with h/o low back pain s/p lumbar discectomy in 2007. Over the past 3 years, pt states he has been experiencing worsening low back pain with pain radiating into both legs with an occasional "hot sensation" in his calves. He is currently taking meloxicam which offer little to no pain relief. Presents for L3-L4 laminectomy, posterior lumbar interbody fusion, pedicle screw fixation, iliac crest bone graft.       *covid scheduled for 1/19 at [facility] beds at the [facility]. (06 Jan 2021 17:00)    45y Male s/p    PLAN:  NEURO:  CARDIOVASCULAR:  PULMONARY:  RENAL:  GI:  HEME:  ID:  ENDO:    DVT PROPHYLAXIS:  [x] Venodynes                                [] Heparin/Lovenox    FALL RISK:  [] Low Risk                                    [] Impulsive
SUBJECTIVE: C/O back pain. +flatus. B/L LE Radic pain better postop. NAD    OVERNIGHT EVENTS: Liquid stool x 2, then +BM at MN 0030    Vital Signs Last 24 Hrs  T(C): 36.8 (2021 12:15), Max: 37.4 (2021 04:32)  T(F): 98.2 (2021 12:15), Max: 99.4 (2021 04:32)  HR: 98 (2021 12:15) (95 - 102)  BP: 105/73 (2021 12:15) (105/73 - 117/77)  BP(mean): --  RR: 16 (2021 12:15) (16 - 18)  SpO2: 99% (2021 12:15) (96% - 99%)  DIET: [X ] Regular [ ] CCD [ ] Renal [ ] Puree [ ] Dysphagia [ ] Tube Feeds:   PCA: [ ] YES [X ] NO   CLIFFORD: [X ] YES [ ] NO [ ] VOID DC'd , Replaced  due to urinary retention  BM: [X ] YES Liquid stool x2, +BM x1    DRAINS: [ ] VLAD (cc/24h) [X ]  (cc/24h)    PHYSICAL EXAM:    Constitutional: No Acute Distress     Neurological: AAOx3, Following Commands, Moving all Extremities     Motor exam:          Upper extremity                         Delt     Bicep     Tricep    HG                                                 R         5/5        5/5        5/5       5/5                                               L          5/5        5/5        5/5       5/5          Lower extremity                        HF         KF        KE       DF         PF                                                  R        5/5        5/5        5/5       5/5         5/5                                               L         5/5        5/5       5/5       5/5          5/5                                                 Sensation: [X] intact to light touch  [] decreased:     Pulmonary: Clear to Auscultation, No rales, No rhonchi, No wheezes     Cardiovascular: S1, S2, Regular rate and rhythm     Gastrointestinal: Soft, Non-tender, Non-distended     Extremities: No calf tenderness     Incision: HMV x 1 active. Aquacel dsg-CDI/Flat    LABS:                                 10.3   16.29 )-----------( 219      ( 2021 06:26 )             30.8         137  |  102  |  8   ----------------------------<  78  3.1<L>   |  26  |  0.79    Ca    8.2<L>      2021 06:26    COVID-19 PCR: NotDetec (2021 14:15)    Urinalysis Basic - ( 2021 20:38 )  Color: Colorless / Appearance: Clear / S.014 / pH: x  Gluc: x / Ketone: Negative  / Bili: Negative / Urobili: Negative   Blood: x / Protein: Negative / Nitrite: Negative   Leuk Esterase: Negative / RBC: 5 /hpf / WBC 2 /HPF   Sq Epi: x / Non Sq Epi: 1 /hpf / Bacteria: Negative    Culture - Urine (21 @ 00:56)    Specimen Source: .Urine Clean Catch (Midstream)    Culture Results:   No growth    Culture - Blood (21 @ 23:13)    Specimen Source: .Blood Blood    Culture Results:   No growth to date.    IMAGING:  < from: Xray Abdomen 1 View PORTABLE -Routine (Xray Abdomen 1 View PORTABLE -Routine .) (21 @ 17:56) >  Numerous mildly dilated loops of small bowel, suggestive of ileus.    < from: Xray Chest 1 View- PORTABLE-Urgent (Xray Chest 1 View- PORTABLE-Urgent .) (21 @ 20:46) >  Clear lungs.    MEDICATIONS  (STANDING):  acetaminophen  IVPB .. 1000 milliGRAM(s) IV Intermittent once  acetaminophen  IVPB .. 1000 milliGRAM(s) IV Intermittent once  enoxaparin Injectable 40 milliGRAM(s) SubCutaneous at bedtime  influenza   Vaccine 0.5 milliLiter(s) IntraMuscular once  lactated ringers. 1000 milliLiter(s) (75 mL/Hr) IV Continuous <Continuous>  loratadine 10 milliGRAM(s) Oral daily  pantoprazole  Injectable 40 milliGRAM(s) IV Push daily  polyethylene glycol 3350 17 Gram(s) Oral two times a day  senna 2 Tablet(s) Oral at bedtime    MEDICATIONS  (PRN):  acetaminophen   Tablet .. 650 milliGRAM(s) Oral every 6 hours PRN Temp greater or equal to 38C (100.4F), Mild Pain (1 - 3)  bisacodyl Suppository 10 milliGRAM(s) Rectal daily PRN Constipation  methocarbamol 500 milliGRAM(s) Oral every 8 hours PRN Muscle Spasm  oxyCODONE    IR 5 milliGRAM(s) Oral every 4 hours PRN Moderate Pain (4 - 6)  oxyCODONE    IR 10 milliGRAM(s) Oral every 4 hours PRN Severe Pain (7 - 10)    
SUBJECTIVE: Pt seen by Dr Stephenson at this time. Doing well, NAD. B/L LE Radic pain better postop.    OVERNIGHT EVENTS: None    Vital Signs Last 24 Hrs  T(C): 36.7 (23 Jan 2021 09:31), Max: 36.7 (23 Jan 2021 03:55)  T(F): 98.1 (23 Jan 2021 09:31), Max: 98.1 (23 Jan 2021 03:55)  HR: 90 (23 Jan 2021 09:31) (58 - 98)  BP: 90/59 (23 Jan 2021 09:31) (67/41 - 120/62)  BP(mean): 72 (23 Jan 2021 02:32) (47 - 85)  RR: 16 (23 Jan 2021 09:31) (14 - 18)  SpO2: 97% (23 Jan 2021 09:31) (97% - 100%)  IVF: [ ] IVL [ X] LR@75  DIET: [X ] Regular [ ] CCD [ ] Renal [ ] Puree [ ] Dysphagia [ ] Tube Feeds:   PCA: [ ] YES [X ] NO   CLIFFORD: [X ] YES [ ] NO [ ] VOID DC'd 1/23  BM: [ ] YES [X ] NO     DRAINS: [ ] VLAD (cc/24h) [X ] HMV R/L=80/265 (cc/24h)    PHYSICAL EXAM:    Constitutional: No Acute Distress     Neurological: AAOx3, Following Commands, Moving all Extremities     Motor exam:          Upper extremity                         Delt     Bicep     Tricep    HG                                                 R         5/5        5/5        5/5       5/5                                               L          5/5        5/5        5/5       5/5          Lower extremity                        HF         KF        KE       DF         PF                                                  R        5/5        5/5        5/5       5/5         5/5                                               L         5/5        5/5       5/5       5/5          5/5                                                 Sensation: [X] intact to light touch  [] decreased:     Pulmonary: Clear to Auscultation, No rales, No rhonchi, No wheezes     Cardiovascular: S1, S2, Regular rate and rhythm     Gastrointestinal: Soft, Non-tender, Non-distended     Extremities: No calf tenderness     Incision: HMV x 2 active. Aquacel dsg-CDI/Flat    LABS:                        9.7    15.56 )-----------( 198      ( 23 Jan 2021 07:49 )             29.0    01-23    136  |  105  |  10  ----------------------------<  110<H>  3.7   |  22  |  0.78    Ca    8.3<L>      23 Jan 2021 07:49    COVID-19 PCR: NotDetec (19 Jan 2021 14:15)    IMAGING:    MEDICATIONS  (STANDING):  cyclobenzaprine 5 milliGRAM(s) Oral every 8 hours  enoxaparin Injectable 40 milliGRAM(s) SubCutaneous at bedtime  influenza   Vaccine 0.5 milliLiter(s) IntraMuscular once  lactated ringers. 1000 milliLiter(s) (75 mL/Hr) IV Continuous <Continuous>  loratadine 10 milliGRAM(s) Oral daily  polyethylene glycol 3350 17 Gram(s) Oral two times a day  senna 2 Tablet(s) Oral at bedtime    MEDICATIONS  (PRN):  acetaminophen   Tablet .. 650 milliGRAM(s) Oral every 6 hours PRN Temp greater or equal to 38C (100.4F), Mild Pain (1 - 3)  oxyCODONE    IR 5 milliGRAM(s) Oral every 4 hours PRN Moderate Pain (4 - 6)  oxyCODONE    IR 10 milliGRAM(s) Oral every 4 hours PRN Severe Pain (7 - 10)

## 2021-01-27 NOTE — CHART NOTE - NSCHARTNOTEFT_GEN_A_CORE
MARLI [id number]      Drain type: []SD []SG [] VLAD [x] posterior back HMV [] Lumbar drain [] EVD [] ICP [place] [] Abd drain     Patient's position while drain removed: supine    [x] Patient tolerated well [] No complications [x] complications:     Exit Site secured with: [] __ staples [] __ suture (please specify how many of each) exit site secured with steri strips    Additional Info: re-dressed surgical incision with gauze and tegaderm C/D/I

## 2021-01-27 NOTE — PROGRESS NOTE ADULT - ASSESSMENT
ASSESSMENT AND PLAN: 44 y/o male with h/o low back pain s/p lumbar discectomy in 2007. Over the past 3 years, pt states he has been experiencing worsening low back pain with pain radiating into both legs with an occasional "hot sensation" in his calves. He is currently taking meloxicam which offer little to no pain relief.     s/p L3-L5 PLIF POD 5    NEURO:   - Continue neuro checks q 4  - Hemovac removed today  - Continue pain control with tylenol prn and oxycodone prn  - Continue Robaxin for muscle spasms prn  - Continue Melatonin for insomnia  - PT - home w/ home PT with rolling walker and 3:1 commode    PULM:   - Encourage incentive spirometry  - On room air, O2Sat>97%    CV:  - -160    ENDO:   - Goal Euglycemia, no issues     HEME/ONC:             1/26 CBC showed improved leukocytosis          DVT ppx: SCDs, LE [place] 1/25 - negative, SQL    RENAL:   - On LR@75 will d/c today as oral intake improves  - BMP yesterday reveals K 3.6, supplemented with KCl 40millequivalent PO x 1    ID:   - Afebrile  - Prior fever w/u on 1/23 shows NGTD  - 1/19 COVID neg  - 1/7 MRSA/MSSA swab negative    GI:    - Was NPO for ileus seen on Abd Xray on 1/24, with follow up Abd Xray yesterday showing resolving ileus, continued to have further bowel movements and tolerating liquid diet with no issues. Advanced his diet today.   - Continue senna, miralax, and prn dulcolax PO  - Continue reglan x 8 doses, to finish today    :  - Lemons re-inserted on 1/23 for urinary retention, flomax started 1/25. Lemons removed yesterday. And patient voided overnight.     DISCHARGE PLANNING:   PT - home w/ home PT with rolling walker and 3:1 commode    Plan to be discussed w/ Dr. Stephenson  [id number]

## 2021-01-29 LAB
CULTURE RESULTS: SIGNIFICANT CHANGE UP
CULTURE RESULTS: SIGNIFICANT CHANGE UP
SPECIMEN SOURCE: SIGNIFICANT CHANGE UP
SPECIMEN SOURCE: SIGNIFICANT CHANGE UP

## 2022-03-08 NOTE — PROGRESS NOTE ADULT - NSHPATTENDINGPLANDISCUSS_GEN_ALL_CORE
Pt
BRYANNA Stephenson
BRYANNA Stephenson
Pt and wife
Pt
Pt
[No Acute Distress] : no acute distress
[Normal Sclera/Conjunctiva] : normal sclera/conjunctiva
[PERRL] : pupils equal round and reactive to light
[EOMI] : extraocular movements intact
[No Lymphadenopathy] : no lymphadenopathy
[Supple] : supple
[Thyroid Normal, No Nodules] : the thyroid was normal and there were no nodules present
[No Respiratory Distress] : no respiratory distress 
[No Accessory Muscle Use] : no accessory muscle use
[Clear to Auscultation] : lungs were clear to auscultation bilaterally
[Normal Rate] : normal rate 
[Regular Rhythm] : with a regular rhythm
[Normal S1, S2] : normal S1 and S2
[No Murmur] : no murmur heard
[No Edema] : there was no peripheral edema
[Soft] : abdomen soft
[Non Tender] : non-tender
[Non-distended] : non-distended
[Normal Bowel Sounds] : normal bowel sounds
[Normal Posterior Cervical Nodes] : no posterior cervical lymphadenopathy
[Normal Anterior Cervical Nodes] : no anterior cervical lymphadenopathy
[No Spinal Tenderness] : no spinal tenderness
[Grossly Normal Strength/Tone] : grossly normal strength/tone
[No Focal Deficits] : no focal deficits
[Normal Gait] : normal gait
[Deep Tendon Reflexes (DTR)] : deep tendon reflexes were 2+ and symmetric
[Normal Affect] : the affect was normal
[Normal Insight/Judgement] : insight and judgment were intact

## 2024-08-27 NOTE — DISCHARGE NOTE PROVIDER - NS AS DC PROVIDER CONTACT Y/N MULTI
[MRI] : MRI [Lumbar Spine] : lumbar spine [Report was reviewed and noted in the chart] : The report was reviewed and noted in the chart [I independently reviewed and interpreted images and report] : I independently reviewed and interpreted images and report [[email or web address]] : 5/7/2024 (MRI Lumbar O&C) Prominent dextroscoliosis associated w/ counterclockwise rotation of the lumbar vertebra w/ accentuated sagittal lordosis. Chronic superior endplate L3 and inferior endplate L1 Schmorl's nodes. Facet joints multilevel arthrosis.  L5-S1: moderate to severe chronic disc degenerative and circumferential bulging; mild facet hypertrophy and mild NF narrowing L4-L5: grade I L4 degenerative spondy; moderate to severe chronic disc degenerative; central/right subligamentous disc herniation indents the thecal sac on the RIGHT L5 nerve root; marked RIGHT facet hypertrophy with ligamentous buckling; moderate to severe LEFT facet hypertrophy; moderate bilateral NF stenosis L3-L4: mildly degenerative bulging disc, moderate facet hypertrophy; grade I degenerative retrolisthesis; devere disc degeneratiion and loss of disc height and signal w/ moderate LEFT NF stenosis Yes